# Patient Record
Sex: FEMALE | Race: WHITE | Employment: OTHER | URBAN - METROPOLITAN AREA
[De-identification: names, ages, dates, MRNs, and addresses within clinical notes are randomized per-mention and may not be internally consistent; named-entity substitution may affect disease eponyms.]

---

## 2017-04-10 PROBLEM — G57.93 NEUROPATHY OF BOTH FEET: Status: ACTIVE | Noted: 2017-04-10

## 2017-04-10 PROBLEM — L85.3 XEROSIS OF SKIN: Status: ACTIVE | Noted: 2017-04-10

## 2017-04-17 PROBLEM — R53.83 OTHER FATIGUE: Status: ACTIVE | Noted: 2017-04-17

## 2017-05-11 PROBLEM — E78.5 DYSLIPIDEMIA: Status: ACTIVE | Noted: 2017-05-11

## 2017-07-11 PROCEDURE — 82570 ASSAY OF URINE CREATININE: CPT | Performed by: INTERNAL MEDICINE

## 2017-07-11 PROCEDURE — 82043 UR ALBUMIN QUANTITATIVE: CPT | Performed by: INTERNAL MEDICINE

## 2017-07-17 PROCEDURE — 82607 VITAMIN B-12: CPT | Performed by: INTERNAL MEDICINE

## 2017-07-17 PROCEDURE — 82746 ASSAY OF FOLIC ACID SERUM: CPT | Performed by: INTERNAL MEDICINE

## 2017-07-18 PROBLEM — E53.8 VITAMIN B12 DEFICIENCY: Status: ACTIVE | Noted: 2017-07-18

## 2017-07-18 PROBLEM — D50.9 IRON DEFICIENCY ANEMIA: Status: ACTIVE | Noted: 2017-07-18

## 2017-07-19 PROBLEM — Z85.048 HISTORY OF ANAL CANCER: Status: ACTIVE | Noted: 2017-07-19

## 2017-08-30 PROCEDURE — 82607 VITAMIN B-12: CPT | Performed by: INTERNAL MEDICINE

## 2017-08-30 PROCEDURE — 82746 ASSAY OF FOLIC ACID SERUM: CPT | Performed by: INTERNAL MEDICINE

## 2017-09-01 PROBLEM — E53.8 FOLIC ACID DEFICIENCY: Status: ACTIVE | Noted: 2017-09-01

## 2017-11-27 PROCEDURE — 82607 VITAMIN B-12: CPT | Performed by: NURSE PRACTITIONER

## 2017-11-27 PROCEDURE — 82746 ASSAY OF FOLIC ACID SERUM: CPT | Performed by: NURSE PRACTITIONER

## 2018-02-26 PROBLEM — E78.5 DYSLIPIDEMIA: Status: RESOLVED | Noted: 2017-05-11 | Resolved: 2018-02-26

## 2018-02-26 PROBLEM — L85.3 XEROSIS OF SKIN: Status: RESOLVED | Noted: 2017-04-10 | Resolved: 2018-02-26

## 2018-02-26 PROCEDURE — 82746 ASSAY OF FOLIC ACID SERUM: CPT | Performed by: NURSE PRACTITIONER

## 2018-02-26 PROCEDURE — 82607 VITAMIN B-12: CPT | Performed by: NURSE PRACTITIONER

## 2018-05-11 ENCOUNTER — LAB ENCOUNTER (OUTPATIENT)
Dept: LAB | Age: 68
End: 2018-05-11
Attending: INTERNAL MEDICINE
Payer: MEDICARE

## 2018-05-11 DIAGNOSIS — E66.9 OBESITY, UNSPECIFIED: Primary | ICD-10-CM

## 2018-05-11 DIAGNOSIS — I10 ESSENTIAL HYPERTENSION, MALIGNANT: ICD-10-CM

## 2018-05-11 PROCEDURE — 86140 C-REACTIVE PROTEIN: CPT

## 2018-05-11 PROCEDURE — 80053 COMPREHEN METABOLIC PANEL: CPT

## 2018-05-11 PROCEDURE — 83690 ASSAY OF LIPASE: CPT

## 2018-05-30 PROCEDURE — 82607 VITAMIN B-12: CPT | Performed by: NURSE PRACTITIONER

## 2018-05-30 PROCEDURE — 82746 ASSAY OF FOLIC ACID SERUM: CPT | Performed by: NURSE PRACTITIONER

## 2018-05-31 PROBLEM — E66.01 MORBID OBESITY (HCC): Status: ACTIVE | Noted: 2018-05-31

## 2018-06-01 PROBLEM — K58.0 IRRITABLE BOWEL SYNDROME WITH DIARRHEA: Status: ACTIVE | Noted: 2018-06-01

## 2018-06-26 PROBLEM — E78.00 PURE HYPERCHOLESTEROLEMIA: Status: ACTIVE | Noted: 2018-06-26

## 2019-01-24 PROCEDURE — 82043 UR ALBUMIN QUANTITATIVE: CPT | Performed by: INTERNAL MEDICINE

## 2019-01-24 PROCEDURE — 82570 ASSAY OF URINE CREATININE: CPT | Performed by: INTERNAL MEDICINE

## 2019-02-15 PROBLEM — E78.00 PURE HYPERCHOLESTEROLEMIA: Status: RESOLVED | Noted: 2018-06-26 | Resolved: 2019-02-15

## 2019-05-10 PROBLEM — E11.42 DIABETIC PERIPHERAL NEUROPATHY (HCC): Status: ACTIVE | Noted: 2019-05-10

## 2021-02-11 PROBLEM — R53.83 OTHER FATIGUE: Status: RESOLVED | Noted: 2017-04-17 | Resolved: 2021-02-11

## 2022-02-16 PROBLEM — G57.93 NEUROPATHY OF BOTH FEET: Status: RESOLVED | Noted: 2017-04-10 | Resolved: 2022-02-16

## 2022-02-16 PROBLEM — I12.9 HYPERTENSIVE KIDNEY DISEASE WITH STAGE 3A CHRONIC KIDNEY DISEASE (HCC): Status: ACTIVE | Noted: 2022-02-16

## 2022-02-16 PROBLEM — N18.31 HYPERTENSIVE KIDNEY DISEASE WITH STAGE 3A CHRONIC KIDNEY DISEASE (HCC): Status: ACTIVE | Noted: 2022-02-16

## 2022-02-16 PROBLEM — E66.01 MORBID OBESITY (HCC): Status: RESOLVED | Noted: 2018-05-31 | Resolved: 2022-02-16

## 2022-02-16 PROBLEM — E11.22 TYPE 2 DIABETES MELLITUS WITH STAGE 3A CHRONIC KIDNEY DISEASE, WITHOUT LONG-TERM CURRENT USE OF INSULIN (HCC): Status: ACTIVE | Noted: 2022-02-16

## 2022-02-16 PROBLEM — N18.31 TYPE 2 DIABETES MELLITUS WITH STAGE 3A CHRONIC KIDNEY DISEASE, WITHOUT LONG-TERM CURRENT USE OF INSULIN (HCC): Status: ACTIVE | Noted: 2022-02-16

## 2022-11-20 ENCOUNTER — APPOINTMENT (OUTPATIENT)
Dept: CT IMAGING | Age: 72
End: 2022-11-20
Attending: EMERGENCY MEDICINE
Payer: MEDICARE

## 2022-11-20 ENCOUNTER — HOSPITAL ENCOUNTER (EMERGENCY)
Age: 72
Discharge: HOME | End: 2022-11-20
Attending: EMERGENCY MEDICINE
Payer: MEDICARE

## 2022-11-20 VITALS
SYSTOLIC BLOOD PRESSURE: 143 MMHG | TEMPERATURE: 98 F | BODY MASS INDEX: 35 KG/M2 | RESPIRATION RATE: 18 BRPM | WEIGHT: 230 LBS | DIASTOLIC BLOOD PRESSURE: 64 MMHG | OXYGEN SATURATION: 98 % | HEART RATE: 84 BPM

## 2022-11-20 DIAGNOSIS — M54.40 BACK PAIN OF LUMBAR REGION WITH SCIATICA: Primary | ICD-10-CM

## 2022-11-20 LAB
ALBUMIN SERPL-MCNC: 4.3 G/DL (ref 3.4–5)
ALBUMIN/GLOB SERPL: 1.4 {RATIO} (ref 1–2)
ALP LIVER SERPL-CCNC: 68 U/L
ALT SERPL-CCNC: 25 U/L
ANION GAP SERPL CALC-SCNC: 11 MMOL/L (ref 0–18)
AST SERPL-CCNC: 17 U/L (ref 15–37)
BASOPHILS # BLD AUTO: 0.03 X10(3) UL (ref 0–0.2)
BASOPHILS NFR BLD AUTO: 0.3 %
BILIRUB SERPL-MCNC: 0.6 MG/DL (ref 0.1–2)
BILIRUB UR QL CFM: NEGATIVE
BUN BLD-MCNC: 23 MG/DL (ref 7–18)
CALCIUM BLD-MCNC: 9.4 MG/DL (ref 8.5–10.1)
CHLORIDE SERPL-SCNC: 97 MMOL/L (ref 98–112)
CLARITY UR REFRACT.AUTO: CLEAR
CO2 SERPL-SCNC: 24 MMOL/L (ref 21–32)
COLOR UR AUTO: YELLOW
CREAT BLD-MCNC: 0.92 MG/DL
EOSINOPHIL # BLD AUTO: 0.01 X10(3) UL (ref 0–0.7)
EOSINOPHIL NFR BLD AUTO: 0.1 %
ERYTHROCYTE [DISTWIDTH] IN BLOOD BY AUTOMATED COUNT: 13.1 %
GFR SERPLBLD BASED ON 1.73 SQ M-ARVRAT: 66 ML/MIN/1.73M2 (ref 60–?)
GLOBULIN PLAS-MCNC: 3.1 G/DL (ref 2.8–4.4)
GLUCOSE BLD-MCNC: 148 MG/DL (ref 70–99)
GLUCOSE UR STRIP.AUTO-MCNC: 500 MG/DL
HCT VFR BLD AUTO: 39.9 %
HGB BLD-MCNC: 13.7 G/DL
IMM GRANULOCYTES # BLD AUTO: 0.1 X10(3) UL (ref 0–1)
IMM GRANULOCYTES NFR BLD: 1 %
KETONES UR STRIP.AUTO-MCNC: 40 MG/DL
LEUKOCYTE ESTERASE UR QL STRIP.AUTO: NEGATIVE
LIPASE SERPL-CCNC: 363 U/L (ref 73–393)
LYMPHOCYTES # BLD AUTO: 0.75 X10(3) UL (ref 1–4)
LYMPHOCYTES NFR BLD AUTO: 7.2 %
MCH RBC QN AUTO: 29.5 PG (ref 26–34)
MCHC RBC AUTO-ENTMCNC: 34.3 G/DL (ref 31–37)
MCV RBC AUTO: 85.8 FL
MONOCYTES # BLD AUTO: 0.49 X10(3) UL (ref 0.1–1)
MONOCYTES NFR BLD AUTO: 4.7 %
NEUTROPHILS # BLD AUTO: 8.99 X10 (3) UL (ref 1.5–7.7)
NEUTROPHILS # BLD AUTO: 8.99 X10(3) UL (ref 1.5–7.7)
NEUTROPHILS NFR BLD AUTO: 86.7 %
NITRITE UR QL STRIP.AUTO: NEGATIVE
OSMOLALITY SERPL CALC.SUM OF ELEC: 280 MOSM/KG (ref 275–295)
PH UR STRIP.AUTO: 5 [PH] (ref 5–8)
PLATELET # BLD AUTO: 189 10(3)UL (ref 150–450)
POTASSIUM SERPL-SCNC: 3.8 MMOL/L (ref 3.5–5.1)
PROT SERPL-MCNC: 7.4 G/DL (ref 6.4–8.2)
RBC # BLD AUTO: 4.65 X10(6)UL
SODIUM SERPL-SCNC: 132 MMOL/L (ref 136–145)
SP GR UR STRIP.AUTO: 1.02 (ref 1–1.03)
UROBILINOGEN UR STRIP.AUTO-MCNC: 0.2 MG/DL
WBC # BLD AUTO: 10.4 X10(3) UL (ref 4–11)

## 2022-11-20 PROCEDURE — 96374 THER/PROPH/DIAG INJ IV PUSH: CPT

## 2022-11-20 PROCEDURE — 81001 URINALYSIS AUTO W/SCOPE: CPT | Performed by: EMERGENCY MEDICINE

## 2022-11-20 PROCEDURE — 99284 EMERGENCY DEPT VISIT MOD MDM: CPT

## 2022-11-20 PROCEDURE — 85025 COMPLETE CBC W/AUTO DIFF WBC: CPT | Performed by: EMERGENCY MEDICINE

## 2022-11-20 PROCEDURE — 96376 TX/PRO/DX INJ SAME DRUG ADON: CPT

## 2022-11-20 PROCEDURE — 83690 ASSAY OF LIPASE: CPT | Performed by: EMERGENCY MEDICINE

## 2022-11-20 PROCEDURE — 74176 CT ABD & PELVIS W/O CONTRAST: CPT | Performed by: EMERGENCY MEDICINE

## 2022-11-20 PROCEDURE — 81015 MICROSCOPIC EXAM OF URINE: CPT | Performed by: EMERGENCY MEDICINE

## 2022-11-20 PROCEDURE — 96375 TX/PRO/DX INJ NEW DRUG ADDON: CPT

## 2022-11-20 PROCEDURE — 96361 HYDRATE IV INFUSION ADD-ON: CPT

## 2022-11-20 PROCEDURE — 99285 EMERGENCY DEPT VISIT HI MDM: CPT

## 2022-11-20 PROCEDURE — 80053 COMPREHEN METABOLIC PANEL: CPT | Performed by: EMERGENCY MEDICINE

## 2022-11-20 RX ORDER — HYDROCODONE BITARTRATE AND ACETAMINOPHEN 5; 325 MG/1; MG/1
1-2 TABLET ORAL EVERY 6 HOURS PRN
Qty: 10 TABLET | Refills: 0 | Status: SHIPPED | OUTPATIENT
Start: 2022-11-20 | End: 2022-11-25

## 2022-11-20 RX ORDER — SODIUM CHLORIDE 9 MG/ML
INJECTION, SOLUTION INTRAVENOUS CONTINUOUS
Status: DISCONTINUED | OUTPATIENT
Start: 2022-11-20 | End: 2022-11-20

## 2022-11-20 RX ORDER — FUROSEMIDE 20 MG/1
20 TABLET ORAL DAILY
COMMUNITY

## 2022-11-20 RX ORDER — HYDROMORPHONE HYDROCHLORIDE 1 MG/ML
0.5 INJECTION, SOLUTION INTRAMUSCULAR; INTRAVENOUS; SUBCUTANEOUS ONCE
Status: COMPLETED | OUTPATIENT
Start: 2022-11-20 | End: 2022-11-20

## 2022-11-20 RX ORDER — ONDANSETRON 2 MG/ML
4 INJECTION INTRAMUSCULAR; INTRAVENOUS ONCE
Status: COMPLETED | OUTPATIENT
Start: 2022-11-20 | End: 2022-11-20

## 2022-11-20 RX ORDER — METHYLPREDNISOLONE 4 MG/1
TABLET ORAL
Qty: 1 EACH | Refills: 0 | Status: SHIPPED | OUTPATIENT
Start: 2022-11-20

## 2022-11-20 RX ORDER — KETOROLAC TROMETHAMINE 15 MG/ML
15 INJECTION, SOLUTION INTRAMUSCULAR; INTRAVENOUS ONCE
Status: COMPLETED | OUTPATIENT
Start: 2022-11-20 | End: 2022-11-20

## 2022-11-20 NOTE — DISCHARGE INSTRUCTIONS
Follow-up for further evaluation primary physician. Return if new or worse symptoms. Activity as tolerated. Medrol Dosepak first.  Juan Parry if needed.

## 2022-11-20 NOTE — ED INITIAL ASSESSMENT (HPI)
Lower back pain over last week. Seen chiropractor earlier in week and given \"a couple\" of tx. Vacuuming yesterday and return of pain to lower back.   This morning with painful urination

## 2023-08-18 ENCOUNTER — HOSPITAL ENCOUNTER (INPATIENT)
Facility: HOSPITAL | Age: 73
LOS: 2 days | Discharge: HOME | End: 2023-08-20
Attending: EMERGENCY MEDICINE | Admitting: HOSPITALIST
Payer: MEDICARE

## 2023-08-18 ENCOUNTER — HOSPITAL ENCOUNTER (OUTPATIENT)
Facility: HOSPITAL | Age: 73
Setting detail: OBSERVATION
Discharge: HOME | End: 2023-08-20
Attending: EMERGENCY MEDICINE | Admitting: HOSPITALIST
Payer: MEDICARE

## 2023-08-18 DIAGNOSIS — Z85.048 HISTORY OF ANAL CANCER: ICD-10-CM

## 2023-08-18 DIAGNOSIS — N17.9 ACUTE RENAL FAILURE, UNSPECIFIED ACUTE RENAL FAILURE TYPE (HCC): Primary | ICD-10-CM

## 2023-08-18 PROBLEM — E87.6 HYPOKALEMIA: Status: ACTIVE | Noted: 2023-08-18

## 2023-08-18 PROBLEM — E87.1 HYPONATREMIA: Status: ACTIVE | Noted: 2023-08-18

## 2023-08-18 LAB
ALBUMIN SERPL-MCNC: 4.4 G/DL (ref 3.4–5)
ALBUMIN/GLOB SERPL: 1.3 {RATIO} (ref 1–2)
ALP LIVER SERPL-CCNC: 64 U/L
ALT SERPL-CCNC: 30 U/L
ANION GAP SERPL CALC-SCNC: 10 MMOL/L (ref 0–18)
AST SERPL-CCNC: 22 U/L (ref 15–37)
BASOPHILS # BLD AUTO: 0.05 X10(3) UL (ref 0–0.2)
BASOPHILS NFR BLD AUTO: 0.5 %
BILIRUB SERPL-MCNC: 0.7 MG/DL (ref 0.1–2)
BILIRUB UR QL STRIP.AUTO: NEGATIVE
BUN BLD-MCNC: 34 MG/DL (ref 7–18)
CALCIUM BLD-MCNC: 10 MG/DL (ref 8.5–10.1)
CHLORIDE SERPL-SCNC: 92 MMOL/L (ref 98–112)
CLARITY UR REFRACT.AUTO: CLEAR
CO2 SERPL-SCNC: 27 MMOL/L (ref 21–32)
COLOR UR AUTO: COLORLESS
CREAT BLD-MCNC: 2.15 MG/DL
CREAT UR-SCNC: 27.6 MG/DL
CREAT UR-SCNC: 27.6 MG/DL
EGFRCR SERPLBLD CKD-EPI 2021: 24 ML/MIN/1.73M2 (ref 60–?)
EOSINOPHIL # BLD AUTO: 0.05 X10(3) UL (ref 0–0.7)
EOSINOPHIL NFR BLD AUTO: 0.5 %
ERYTHROCYTE [DISTWIDTH] IN BLOOD BY AUTOMATED COUNT: 12.9 %
GLOBULIN PLAS-MCNC: 3.3 G/DL (ref 2.8–4.4)
GLUCOSE BLD-MCNC: 66 MG/DL (ref 70–99)
GLUCOSE BLD-MCNC: 74 MG/DL (ref 70–99)
GLUCOSE BLD-MCNC: 83 MG/DL (ref 70–99)
GLUCOSE BLD-MCNC: 84 MG/DL (ref 70–99)
GLUCOSE BLD-MCNC: 98 MG/DL (ref 70–99)
GLUCOSE BLD-MCNC: 98 MG/DL (ref 70–99)
GLUCOSE UR STRIP.AUTO-MCNC: 500 MG/DL
HCT VFR BLD AUTO: 39.1 %
HGB BLD-MCNC: 14 G/DL
IMM GRANULOCYTES # BLD AUTO: 0.03 X10(3) UL (ref 0–1)
IMM GRANULOCYTES NFR BLD: 0.3 %
KETONES UR STRIP.AUTO-MCNC: 10 MG/DL
LEUKOCYTE ESTERASE UR QL STRIP.AUTO: 75
LYMPHOCYTES # BLD AUTO: 0.98 X10(3) UL (ref 1–4)
LYMPHOCYTES NFR BLD AUTO: 10.5 %
MCH RBC QN AUTO: 29.2 PG (ref 26–34)
MCHC RBC AUTO-ENTMCNC: 35.8 G/DL (ref 31–37)
MCV RBC AUTO: 81.5 FL
MICROALBUMIN UR-MCNC: 4.92 MG/DL
MICROALBUMIN/CREAT 24H UR-RTO: 178.3 UG/MG (ref ?–30)
MONOCYTES # BLD AUTO: 0.54 X10(3) UL (ref 0.1–1)
MONOCYTES NFR BLD AUTO: 5.8 %
NEUTROPHILS # BLD AUTO: 7.7 X10 (3) UL (ref 1.5–7.7)
NEUTROPHILS # BLD AUTO: 7.7 X10(3) UL (ref 1.5–7.7)
NEUTROPHILS NFR BLD AUTO: 82.4 %
NITRITE UR QL STRIP.AUTO: NEGATIVE
OSMOLALITY SERPL CALC.SUM OF ELEC: 276 MOSM/KG (ref 275–295)
PH UR STRIP.AUTO: 5.5 [PH] (ref 5–8)
PLATELET # BLD AUTO: 182 10(3)UL (ref 150–450)
POTASSIUM SERPL-SCNC: 3.1 MMOL/L (ref 3.5–5.1)
PROT SERPL-MCNC: 7.7 G/DL (ref 6.4–8.2)
PROT UR STRIP.AUTO-MCNC: NEGATIVE MG/DL
PROT UR-MCNC: 24.1 MG/DL
PROT/CREAT UR-RTO: 0.87
RBC # BLD AUTO: 4.8 X10(6)UL
SODIUM SERPL-SCNC: 129 MMOL/L (ref 136–145)
SODIUM SERPL-SCNC: 23 MMOL/L
SP GR UR STRIP.AUTO: <1.005 (ref 1–1.03)
TSI SER-ACNC: 0.53 MIU/ML (ref 0.36–3.74)
UROBILINOGEN UR STRIP.AUTO-MCNC: NORMAL MG/DL
WBC # BLD AUTO: 9.4 X10(3) UL (ref 4–11)

## 2023-08-18 PROCEDURE — 83935 ASSAY OF URINE OSMOLALITY: CPT | Performed by: INTERNAL MEDICINE

## 2023-08-18 PROCEDURE — 81001 URINALYSIS AUTO W/SCOPE: CPT | Performed by: INTERNAL MEDICINE

## 2023-08-18 PROCEDURE — 82962 GLUCOSE BLOOD TEST: CPT

## 2023-08-18 PROCEDURE — 84443 ASSAY THYROID STIM HORMONE: CPT | Performed by: INTERNAL MEDICINE

## 2023-08-18 PROCEDURE — 99285 EMERGENCY DEPT VISIT HI MDM: CPT

## 2023-08-18 PROCEDURE — 82043 UR ALBUMIN QUANTITATIVE: CPT | Performed by: INTERNAL MEDICINE

## 2023-08-18 PROCEDURE — 84300 ASSAY OF URINE SODIUM: CPT | Performed by: INTERNAL MEDICINE

## 2023-08-18 PROCEDURE — 80053 COMPREHEN METABOLIC PANEL: CPT

## 2023-08-18 PROCEDURE — 87086 URINE CULTURE/COLONY COUNT: CPT | Performed by: INTERNAL MEDICINE

## 2023-08-18 PROCEDURE — 82570 ASSAY OF URINE CREATININE: CPT | Performed by: INTERNAL MEDICINE

## 2023-08-18 PROCEDURE — 85025 COMPLETE CBC W/AUTO DIFF WBC: CPT

## 2023-08-18 PROCEDURE — 84156 ASSAY OF PROTEIN URINE: CPT | Performed by: INTERNAL MEDICINE

## 2023-08-18 PROCEDURE — 36415 COLL VENOUS BLD VENIPUNCTURE: CPT

## 2023-08-18 RX ORDER — POTASSIUM CHLORIDE 750 MG/1
10 TABLET, FILM COATED, EXTENDED RELEASE ORAL 2 TIMES DAILY
COMMUNITY

## 2023-08-18 RX ORDER — PANTOPRAZOLE SODIUM 20 MG/1
20 TABLET, DELAYED RELEASE ORAL
Status: DISCONTINUED | OUTPATIENT
Start: 2023-08-19 | End: 2023-08-20

## 2023-08-18 RX ORDER — LEVOTHYROXINE SODIUM 88 UG/1
88 TABLET ORAL DAILY
Status: DISCONTINUED | OUTPATIENT
Start: 2023-08-19 | End: 2023-08-19

## 2023-08-18 RX ORDER — ONDANSETRON 2 MG/ML
4 INJECTION INTRAMUSCULAR; INTRAVENOUS EVERY 6 HOURS PRN
Status: DISCONTINUED | OUTPATIENT
Start: 2023-08-18 | End: 2023-08-20

## 2023-08-18 RX ORDER — ASPIRIN 81 MG/1
81 TABLET ORAL DAILY
Status: DISCONTINUED | OUTPATIENT
Start: 2023-08-19 | End: 2023-08-20

## 2023-08-18 RX ORDER — ACETAMINOPHEN 325 MG/1
650 TABLET ORAL EVERY 6 HOURS PRN
Status: DISCONTINUED | OUTPATIENT
Start: 2023-08-18 | End: 2023-08-20

## 2023-08-18 RX ORDER — METOPROLOL TARTRATE 50 MG/1
50 TABLET, FILM COATED ORAL 2 TIMES DAILY
Status: DISCONTINUED | OUTPATIENT
Start: 2023-08-19 | End: 2023-08-20

## 2023-08-18 RX ORDER — ROSUVASTATIN CALCIUM 20 MG/1
20 TABLET, COATED ORAL DAILY
Status: DISCONTINUED | OUTPATIENT
Start: 2023-08-19 | End: 2023-08-20

## 2023-08-18 RX ORDER — POTASSIUM CHLORIDE 20 MEQ/1
40 TABLET, EXTENDED RELEASE ORAL ONCE
Status: COMPLETED | OUTPATIENT
Start: 2023-08-18 | End: 2023-08-18

## 2023-08-18 RX ORDER — LEVOTHYROXINE SODIUM 88 UG/1
88 TABLET ORAL
COMMUNITY

## 2023-08-18 RX ORDER — TIRZEPATIDE 7.5 MG/.5ML
7.5 INJECTION, SOLUTION SUBCUTANEOUS WEEKLY
COMMUNITY

## 2023-08-18 RX ORDER — ASCORBIC ACID 500 MG
1000 TABLET ORAL DAILY
Status: DISCONTINUED | OUTPATIENT
Start: 2023-08-19 | End: 2023-08-20

## 2023-08-18 RX ORDER — LOPERAMIDE HCL 1 MG/7.5ML
2 SOLUTION ORAL 4 TIMES DAILY PRN
Status: DISCONTINUED | OUTPATIENT
Start: 2023-08-18 | End: 2023-08-20

## 2023-08-18 RX ORDER — HEPARIN SODIUM 5000 [USP'U]/ML
5000 INJECTION, SOLUTION INTRAVENOUS; SUBCUTANEOUS EVERY 8 HOURS SCHEDULED
Status: DISCONTINUED | OUTPATIENT
Start: 2023-08-18 | End: 2023-08-20

## 2023-08-18 RX ORDER — SODIUM CHLORIDE 9 MG/ML
INJECTION, SOLUTION INTRAVENOUS CONTINUOUS
Status: DISCONTINUED | OUTPATIENT
Start: 2023-08-18 | End: 2023-08-20

## 2023-08-18 NOTE — ED QUICK NOTES
Rounding Completed. Received report from [place]. Patient is alert and oriented, resting in position of comfort, vitals stable, no distress noted. Plan of Care reviewed. Waiting for room assignment. Elimination needs assessed. Provided warm blanket and family presence. Bed is locked and in lowest position. Call light within reach.

## 2023-08-18 NOTE — ED QUICK NOTES
Orders for admission, patient is aware of plan and ready to go upstairs. Any questions, please call ED RN Elen Rivera at extension [phone].      Patient Covid vaccination status: Fully vaccinated     COVID Test Ordered in ED: None    COVID Suspicion at Admission: N/A    Running Infusions:  None    Mental Status/LOC at time of transport: AAOx4    Other pertinent information:   CIWA score: N/A   NIH score:  N/A

## 2023-08-18 NOTE — ED INITIAL ASSESSMENT (HPI)
Patient arrives to the ED via walk-in A&Ox4/4 and ambulatory with c/o BALJEET from [address] [place] [address] Nephrology. Per call-in note, patient has a creatine of 2.4 and was told to go to the ED for further eval and workup.

## 2023-08-18 NOTE — PLAN OF CARE
Patient admitted to room 384 for BALJEET from nephrologist office. Vitals stable, pain controlled. Wound care and renal consulted. No

## 2023-08-19 ENCOUNTER — APPOINTMENT (OUTPATIENT)
Dept: ULTRASOUND IMAGING | Facility: HOSPITAL | Age: 73
End: 2023-08-19
Attending: INTERNAL MEDICINE
Payer: MEDICARE

## 2023-08-19 LAB
ALBUMIN SERPL-MCNC: 3.6 G/DL (ref 3.4–5)
ANION GAP SERPL CALC-SCNC: 9 MMOL/L (ref 0–18)
BASOPHILS # BLD AUTO: 0.04 X10(3) UL (ref 0–0.2)
BASOPHILS NFR BLD AUTO: 0.8 %
BUN BLD-MCNC: 26 MG/DL (ref 7–18)
C3 SERPL-MCNC: 105 MG/DL (ref 90–180)
C4 SERPL-MCNC: 28.1 MG/DL (ref 10–40)
CALCIUM BLD-MCNC: 9.1 MG/DL (ref 8.5–10.1)
CHLORIDE SERPL-SCNC: 102 MMOL/L (ref 98–112)
CK SERPL-CCNC: 60 U/L
CO2 SERPL-SCNC: 25 MMOL/L (ref 21–32)
CREAT BLD-MCNC: 1.49 MG/DL
EGFRCR SERPLBLD CKD-EPI 2021: 37 ML/MIN/1.73M2 (ref 60–?)
EOSINOPHIL # BLD AUTO: 0.11 X10(3) UL (ref 0–0.7)
EOSINOPHIL NFR BLD AUTO: 2.2 %
EOSINOPHIL URNS QL WRIGHT STN: NEGATIVE
ERYTHROCYTE [DISTWIDTH] IN BLOOD BY AUTOMATED COUNT: 12.9 %
GLUCOSE BLD-MCNC: 81 MG/DL (ref 70–99)
GLUCOSE BLD-MCNC: 82 MG/DL (ref 70–99)
GLUCOSE BLD-MCNC: 84 MG/DL (ref 70–99)
GLUCOSE BLD-MCNC: 85 MG/DL (ref 70–99)
GLUCOSE BLD-MCNC: 91 MG/DL (ref 70–99)
GLUCOSE BLD-MCNC: 94 MG/DL (ref 70–99)
HBV CORE AB SERPL QL IA: NONREACTIVE
HBV SURFACE AB SER QL: NONREACTIVE
HBV SURFACE AB SERPL IA-ACNC: <3.1 MIU/ML
HBV SURFACE AG SER-ACNC: <0.1 [IU]/L
HBV SURFACE AG SERPL QL IA: NONREACTIVE
HCT VFR BLD AUTO: 34.7 %
HCV AB SERPL QL IA: NONREACTIVE
HGB BLD-MCNC: 12.1 G/DL
IMM GRANULOCYTES # BLD AUTO: 0.02 X10(3) UL (ref 0–1)
IMM GRANULOCYTES NFR BLD: 0.4 %
LYMPHOCYTES # BLD AUTO: 1.54 X10(3) UL (ref 1–4)
LYMPHOCYTES NFR BLD AUTO: 30.2 %
MAGNESIUM SERPL-MCNC: 1.8 MG/DL (ref 1.6–2.6)
MCH RBC QN AUTO: 28.8 PG (ref 26–34)
MCHC RBC AUTO-ENTMCNC: 34.9 G/DL (ref 31–37)
MCV RBC AUTO: 82.6 FL
MONOCYTES # BLD AUTO: 0.68 X10(3) UL (ref 0.1–1)
MONOCYTES NFR BLD AUTO: 13.3 %
NEUTROPHILS # BLD AUTO: 2.71 X10 (3) UL (ref 1.5–7.7)
NEUTROPHILS # BLD AUTO: 2.71 X10(3) UL (ref 1.5–7.7)
NEUTROPHILS NFR BLD AUTO: 53.1 %
OSMOLALITY SERPL CALC.SUM OF ELEC: 286 MOSM/KG (ref 275–295)
OSMOLALITY UR: 196 MOSM/KG (ref 300–1300)
PHOSPHATE SERPL-MCNC: 2.8 MG/DL (ref 2.5–4.9)
PLATELET # BLD AUTO: 144 10(3)UL (ref 150–450)
POTASSIUM SERPL-SCNC: 3 MMOL/L (ref 3.5–5.1)
POTASSIUM SERPL-SCNC: 3.3 MMOL/L (ref 3.5–5.1)
PROT UR-MCNC: 41 MG/DL
RBC # BLD AUTO: 4.2 X10(6)UL
SODIUM SERPL-SCNC: 136 MMOL/L (ref 136–145)
WBC # BLD AUTO: 5.1 X10(3) UL (ref 4–11)

## 2023-08-19 PROCEDURE — 87340 HEPATITIS B SURFACE AG IA: CPT | Performed by: INTERNAL MEDICINE

## 2023-08-19 PROCEDURE — 84166 PROTEIN E-PHORESIS/URINE/CSF: CPT | Performed by: INTERNAL MEDICINE

## 2023-08-19 PROCEDURE — 85025 COMPLETE CBC W/AUTO DIFF WBC: CPT | Performed by: INTERNAL MEDICINE

## 2023-08-19 PROCEDURE — 82550 ASSAY OF CK (CPK): CPT | Performed by: INTERNAL MEDICINE

## 2023-08-19 PROCEDURE — 86706 HEP B SURFACE ANTIBODY: CPT | Performed by: INTERNAL MEDICINE

## 2023-08-19 PROCEDURE — 86334 IMMUNOFIX E-PHORESIS SERUM: CPT | Performed by: INTERNAL MEDICINE

## 2023-08-19 PROCEDURE — 84132 ASSAY OF SERUM POTASSIUM: CPT | Performed by: HOSPITALIST

## 2023-08-19 PROCEDURE — 83735 ASSAY OF MAGNESIUM: CPT | Performed by: INTERNAL MEDICINE

## 2023-08-19 PROCEDURE — 84165 PROTEIN E-PHORESIS SERUM: CPT | Performed by: INTERNAL MEDICINE

## 2023-08-19 PROCEDURE — 87205 SMEAR GRAM STAIN: CPT | Performed by: INTERNAL MEDICINE

## 2023-08-19 PROCEDURE — 86038 ANTINUCLEAR ANTIBODIES: CPT | Performed by: INTERNAL MEDICINE

## 2023-08-19 PROCEDURE — 86225 DNA ANTIBODY NATIVE: CPT | Performed by: INTERNAL MEDICINE

## 2023-08-19 PROCEDURE — 86335 IMMUNFIX E-PHORSIS/URINE/CSF: CPT | Performed by: INTERNAL MEDICINE

## 2023-08-19 PROCEDURE — 86160 COMPLEMENT ANTIGEN: CPT | Performed by: INTERNAL MEDICINE

## 2023-08-19 PROCEDURE — 83521 IG LIGHT CHAINS FREE EACH: CPT | Performed by: INTERNAL MEDICINE

## 2023-08-19 PROCEDURE — 80069 RENAL FUNCTION PANEL: CPT | Performed by: INTERNAL MEDICINE

## 2023-08-19 PROCEDURE — 86803 HEPATITIS C AB TEST: CPT | Performed by: INTERNAL MEDICINE

## 2023-08-19 PROCEDURE — 86704 HEP B CORE ANTIBODY TOTAL: CPT | Performed by: INTERNAL MEDICINE

## 2023-08-19 PROCEDURE — 84156 ASSAY OF PROTEIN URINE: CPT | Performed by: INTERNAL MEDICINE

## 2023-08-19 PROCEDURE — 82962 GLUCOSE BLOOD TEST: CPT

## 2023-08-19 PROCEDURE — 76775 US EXAM ABDO BACK WALL LIM: CPT | Performed by: INTERNAL MEDICINE

## 2023-08-19 RX ORDER — POTASSIUM CHLORIDE 20 MEQ/1
40 TABLET, EXTENDED RELEASE ORAL ONCE
Status: COMPLETED | OUTPATIENT
Start: 2023-08-19 | End: 2023-08-19

## 2023-08-19 RX ORDER — LEVOTHYROXINE SODIUM 88 UG/1
88 TABLET ORAL
Status: DISCONTINUED | OUTPATIENT
Start: 2023-08-19 | End: 2023-08-20

## 2023-08-19 RX ORDER — MAGNESIUM OXIDE 400 MG/1
400 TABLET ORAL ONCE
Status: COMPLETED | OUTPATIENT
Start: 2023-08-19 | End: 2023-08-19

## 2023-08-19 RX ORDER — POTASSIUM CHLORIDE 20 MEQ/1
40 TABLET, EXTENDED RELEASE ORAL EVERY 4 HOURS
Status: COMPLETED | OUTPATIENT
Start: 2023-08-19 | End: 2023-08-19

## 2023-08-19 NOTE — PLAN OF CARE
Patient A&O X4 on RA- hx VIRAJ no CPAP. VSS, /IS/telemetry- NSR. SCDs, subQ Heparin. Voiding freely via bathroom, LBM 8/18. IVF infusing per orders. Wound to perineum- WOCN on consult. Up ad cait. Reminded to use call light. Plan to dc home when cleared.

## 2023-08-19 NOTE — PLAN OF CARE
Alert and oriented x 4. Vitals stable on room air. Denies pain. Voiding clear yellow urine, denies urinary symptoms. Last BM 8/18/23. Tolerating regular diet. IVF infusing and electrolytes replaced. See flowsheets for open wound to rectal area. Safety precautions in place. Plan for discharge home pending nephrology clearance. Plan of care discussed with patient.

## 2023-08-20 VITALS
HEIGHT: 68 IN | DIASTOLIC BLOOD PRESSURE: 77 MMHG | TEMPERATURE: 98 F | HEART RATE: 77 BPM | SYSTOLIC BLOOD PRESSURE: 140 MMHG | OXYGEN SATURATION: 100 % | BODY MASS INDEX: 32.28 KG/M2 | WEIGHT: 213 LBS | RESPIRATION RATE: 24 BRPM

## 2023-08-20 LAB
ALBUMIN SERPL-MCNC: 3.4 G/DL (ref 3.4–5)
ANION GAP SERPL CALC-SCNC: 4 MMOL/L (ref 0–18)
BUN BLD-MCNC: 18 MG/DL (ref 7–18)
CALCIUM BLD-MCNC: 8.8 MG/DL (ref 8.5–10.1)
CHLORIDE SERPL-SCNC: 109 MMOL/L (ref 98–112)
CO2 SERPL-SCNC: 24 MMOL/L (ref 21–32)
CREAT BLD-MCNC: 1.37 MG/DL
EGFRCR SERPLBLD CKD-EPI 2021: 41 ML/MIN/1.73M2 (ref 60–?)
GLUCOSE BLD-MCNC: 100 MG/DL (ref 70–99)
GLUCOSE BLD-MCNC: 114 MG/DL (ref 70–99)
GLUCOSE BLD-MCNC: 83 MG/DL (ref 70–99)
GLUCOSE BLD-MCNC: 85 MG/DL (ref 70–99)
MAGNESIUM SERPL-MCNC: 1.7 MG/DL (ref 1.6–2.6)
OSMOLALITY SERPL CALC.SUM OF ELEC: 285 MOSM/KG (ref 275–295)
PHOSPHATE SERPL-MCNC: 1.9 MG/DL (ref 2.5–4.9)
PHOSPHATE SERPL-MCNC: 2.4 MG/DL (ref 2.5–4.9)
POTASSIUM SERPL-SCNC: 4.2 MMOL/L (ref 3.5–5.1)
POTASSIUM SERPL-SCNC: 4.2 MMOL/L (ref 3.5–5.1)
SODIUM SERPL-SCNC: 137 MMOL/L (ref 136–145)

## 2023-08-20 PROCEDURE — 82962 GLUCOSE BLOOD TEST: CPT

## 2023-08-20 PROCEDURE — 84100 ASSAY OF PHOSPHORUS: CPT | Performed by: INTERNAL MEDICINE

## 2023-08-20 PROCEDURE — 83735 ASSAY OF MAGNESIUM: CPT | Performed by: INTERNAL MEDICINE

## 2023-08-20 PROCEDURE — 84132 ASSAY OF SERUM POTASSIUM: CPT | Performed by: HOSPITALIST

## 2023-08-20 PROCEDURE — 80069 RENAL FUNCTION PANEL: CPT | Performed by: INTERNAL MEDICINE

## 2023-08-20 RX ORDER — MAGNESIUM OXIDE 400 MG/1
400 TABLET ORAL ONCE
Status: COMPLETED | OUTPATIENT
Start: 2023-08-20 | End: 2023-08-20

## 2023-08-20 RX ORDER — AMLODIPINE BESYLATE 5 MG/1
5 TABLET ORAL 2 TIMES DAILY
Qty: 60 TABLET | Refills: 0 | Status: SHIPPED | OUTPATIENT
Start: 2023-08-20 | End: 2023-09-19

## 2023-08-20 RX ORDER — AMLODIPINE BESYLATE 5 MG/1
5 TABLET ORAL 2 TIMES DAILY
Status: DISCONTINUED | OUTPATIENT
Start: 2023-08-20 | End: 2023-08-20

## 2023-08-20 NOTE — PROGRESS NOTES
Discharge AVS reviewed with patient. All questions answered. IV removed. Bedside belongings packed up and returned to patient.

## 2023-08-20 NOTE — PLAN OF CARE
Alert and oriented x 4. Vitals stable on room air. Denies pain. Voiding clear yellow urine, denies urinary symptoms. Last BM 8/19/23. Tolerating regular diet. IVF infusing and electrolytes replaced. See flowsheets for open wound to rectal area. Safety precautions in place. Plan for discharge home pending nephrology clearance. Plan of care discussed with patient.

## 2023-08-20 NOTE — DISCHARGE INSTRUCTIONS
Keep hydrated and drink plenty of fluids. Maintain a well-balanced diet. Follow up with your nephrologist this week to repeat labs. Notify your nephrologist of any difficulty urinating, pain with urination, dark colored urine, or foul-smelling urine.

## 2023-08-20 NOTE — PLAN OF CARE
Alert and oriented x 4. Vitals stable on room air. Patient denies any pain. Voiding without difficulty. Last BM 8/19/2023. Tolerating regular diet. Independent in room. Safety precautions maintained. Call light within reach.

## 2023-08-20 NOTE — DISCHARGE SUMMARY
General Medicine Discharge Summary     Patient ID:  Bhupinder Smith  68year old  3/22/1950    Admit date: 8/18/2023    Discharge date and time:8/20/2023    Attending Physician: Risa Metcalf MD     Primary Care Physician: Slime Perez) Melanie Butler MD     Reason for admission: see HPI    Discharge Diagnoses: Acute renal failure, unspecified acute renal failure type [facility]) [N17.9]    Discharged Condition: good    Exam: (see progress notes for full details)  No acute distress, alert and oriented    Hospital Course:   Patient is a 68year old female with PMH sig for CAD, HTN, HLD, DM II, VIRAJ, and hypothyroidism who presented to the ED for evaluation of abnormal labs. Her PCP checked a CMP yesterday that showed BUN/Cr 34/2.49 (was 26/0.86 on 4/20/23). Pt has been c/o nausea but denies dizziness, abd pain, vomiting, or diarrhea. Admits that she is not drinking as much as she should. She takes furosemide and hydrochlorothiazide. No F/C or dysuria. In the ED, BUNCr. 34/2.15, Na 129. 40 mEq Kcl given. On my evaluation, pt denies any symptoms. # Acute kidney injury  -Significant improvement with creatinine of 1.37 today from 2.15  -Continue IV fluids,  -Discussed with nephrology  - suspect prerenal due diuretics and poor oral intake  - hold lasix, hydrochlorothiazide, and lisinopril -defer these medications to nephrology on discharge  - renal c/s appreciated IU.   - I renal ultrasound negative for any obstruction  Patient doing well otherwise-  -discussed with nephrology once cleared by nephrology patient likely discharge with repeat BMP in about a week  # Hyponatremia   #Hypokalemia-replete per protocol  -Resolved  - suspect due to diuretics in the setting of poor oral intake   - hold diuretics as above  - IVFs per renal recs  -Repeat BMP as an outpatient     # Essential HTN  - controlled  - hold ACE-I, hydrochlorothiazide -hold HCTZ indefinitely per renal  - resume metoprolol      # HLD  - cont statin     # DM II with hyperglycemia   - hold po MD meds  - ISS with accuchecks      # Hypothyroidism   - cont levothyroxine      # GERD  - cont PPI    Consults: IP CONSULT TO NEPHROLOGY  IP CONSULT TO HOSPITALIST  CONSULT TO WOUND OSTOMY    Operative Procedures:      Disposition: home    Patient Instructions:   Current Discharge Medication List    CONTINUE these medications which have NOT CHANGED    levothyroxine 88 MCG Oral Tab  Take 1 tablet (88 mcg total) by mouth before breakfast.    Tirzepatide (MOUNJARO) 7.5 MG/0.5ML Subcutaneous Solution Pen-injector  Inject 7.5 mg into the skin once a week. Potassium Chloride ER 10 MEQ Oral Tab CR  Take 1 tablet (10 mEq total) by mouth 2 (two) times daily. furosemide 20 MG Oral Tab  Take 1 tablet (20 mg total) by mouth daily. Fexofenadine HCl (ALLEGRA OR)      METOPROLOL TARTRATE 50 MG Oral Tab  TAKE 1 TABLET(50 MG) BY MOUTH TWICE DAILY    Dapagliflozin-metFORMIN HCl ER (XIGDUO XR) 5-1000 MG Oral Tablet 24 Hr  Take 1 tablet by mouth daily. hydroCHLOROthiazide 25 MG Oral Tab  Take 1 tablet (25 mg total) by mouth daily. lisinopril 40 MG Oral Tab  TAKE 1 TABLET BY MOUTH DAILY AS DIRECTED    rosuvastatin 20 MG Oral Tab  Take 1 tablet (20 mg total) by mouth daily. Ascorbic Acid (VITAMIN C) 1000 MG Oral Tab  Take 1 tablet (1,000 mg total) by mouth daily. Glucose Blood In Vitro Strip  Twice a day    OMEPRAZOLE 20 MG Oral Capsule Delayed Release  TAKE 1 CAPSULE BY MOUTH DAILY    aspirin 81 MG Oral Tab  Take 1 tablet (81 mg total) by mouth daily. Omega-3 Fatty Acids (FISH OIL OR)  Take by mouth daily. Loperamide HCl 1 MG/5ML Oral Liquid  Take by mouth 4 (four) times daily as needed for Diarrhea. I reconciled current and discharge medications on day of discharge    Follow-up with PCP, nephrology    No orders of the defined types were placed in this encounter.       Follow-up with labs: BMP    Total Time Coordinating Care: Greater than 30 minutes    Patient had opportunity to ask questions and state understand and agree with therapeutic plan as outlined above.      Thank Vaishnavi Dumont MD

## 2023-08-21 LAB
DSDNA IGG SERPL IA-ACNC: 1.8 IU/ML
ENA AB SER QL IA: 0.2 UG/L
ENA AB SER QL IA: NEGATIVE

## 2023-08-22 LAB
ALBUMIN SERPL ELPH-MCNC: 4.24 G/DL (ref 3.75–5.21)
ALBUMIN/GLOB SERPL: 1.79 {RATIO} (ref 1–2)
ALPHA1 GLOB SERPL ELPH-MCNC: 0.26 G/DL (ref 0.19–0.46)
ALPHA2 GLOB SERPL ELPH-MCNC: 0.81 G/DL (ref 0.48–1.05)
B-GLOBULIN SERPL ELPH-MCNC: 0.68 G/DL (ref 0.68–1.23)
GAMMA GLOB SERPL ELPH-MCNC: 0.62 G/DL (ref 0.62–1.7)
KAPPA LC FREE SER-MCNC: 2.08 MG/DL (ref 0.33–1.94)
KAPPA LC FREE/LAMBDA FREE SER NEPH: 1.37 {RATIO} (ref 0.26–1.65)
LAMBDA LC FREE SERPL-MCNC: 1.52 MG/DL (ref 0.57–2.63)
PROT SERPL-MCNC: 6.6 G/DL (ref 6.4–8.2)

## 2024-03-15 ENCOUNTER — HOSPITAL ENCOUNTER (INPATIENT)
Facility: HOSPITAL | Age: 74
LOS: 3 days | Discharge: HOME | End: 2024-03-18
Attending: EMERGENCY MEDICINE | Admitting: HOSPITALIST
Payer: MEDICARE

## 2024-03-15 DIAGNOSIS — E87.1 HYPONATREMIA: Primary | ICD-10-CM

## 2024-03-15 DIAGNOSIS — R53.1 WEAKNESS GENERALIZED: ICD-10-CM

## 2024-03-15 LAB
ALBUMIN SERPL-MCNC: 4.2 G/DL (ref 3.4–5)
ALBUMIN/GLOB SERPL: 1.4 {RATIO} (ref 1–2)
ALP LIVER SERPL-CCNC: 53 U/L
ALT SERPL-CCNC: 30 U/L
ANION GAP SERPL CALC-SCNC: 11 MMOL/L (ref 0–18)
ANION GAP SERPL CALC-SCNC: 9 MMOL/L (ref 0–18)
AST SERPL-CCNC: 22 U/L (ref 15–37)
BASOPHILS # BLD AUTO: 0.04 X10(3) UL (ref 0–0.2)
BASOPHILS NFR BLD AUTO: 0.5 %
BILIRUB SERPL-MCNC: 0.9 MG/DL (ref 0.1–2)
BILIRUB UR QL STRIP.AUTO: NEGATIVE
BUN BLD-MCNC: 13 MG/DL (ref 9–23)
BUN BLD-MCNC: 14 MG/DL (ref 9–23)
CALCIUM BLD-MCNC: 10.1 MG/DL (ref 8.5–10.1)
CALCIUM BLD-MCNC: 9.6 MG/DL (ref 8.5–10.1)
CHLORIDE SERPL-SCNC: 82 MMOL/L (ref 98–112)
CHLORIDE SERPL-SCNC: 87 MMOL/L (ref 98–112)
CLARITY UR REFRACT.AUTO: CLEAR
CO2 SERPL-SCNC: 28 MMOL/L (ref 21–32)
CO2 SERPL-SCNC: 29 MMOL/L (ref 21–32)
COLOR UR AUTO: COLORLESS
CREAT BLD-MCNC: 0.75 MG/DL
CREAT BLD-MCNC: 0.87 MG/DL
EGFRCR SERPLBLD CKD-EPI 2021: 70 ML/MIN/1.73M2 (ref 60–?)
EGFRCR SERPLBLD CKD-EPI 2021: 84 ML/MIN/1.73M2 (ref 60–?)
EOSINOPHIL # BLD AUTO: 0.02 X10(3) UL (ref 0–0.7)
EOSINOPHIL NFR BLD AUTO: 0.3 %
ERYTHROCYTE [DISTWIDTH] IN BLOOD BY AUTOMATED COUNT: 13.2 %
FLUAV + FLUBV RNA SPEC NAA+PROBE: NEGATIVE
FLUAV + FLUBV RNA SPEC NAA+PROBE: NEGATIVE
GLOBULIN PLAS-MCNC: 2.9 G/DL (ref 2.8–4.4)
GLUCOSE BLD-MCNC: 109 MG/DL (ref 70–99)
GLUCOSE BLD-MCNC: 77 MG/DL (ref 70–99)
GLUCOSE BLD-MCNC: 87 MG/DL (ref 70–99)
GLUCOSE UR STRIP.AUTO-MCNC: NORMAL MG/DL
HCT VFR BLD AUTO: 43.5 %
HGB BLD-MCNC: 15.9 G/DL
HYALINE CASTS #/AREA URNS AUTO: PRESENT /LPF
IMM GRANULOCYTES # BLD AUTO: 0.02 X10(3) UL (ref 0–1)
IMM GRANULOCYTES NFR BLD: 0.3 %
KETONES UR STRIP.AUTO-MCNC: NEGATIVE MG/DL
LEUKOCYTE ESTERASE UR QL STRIP.AUTO: 250
LYMPHOCYTES # BLD AUTO: 1.13 X10(3) UL (ref 1–4)
LYMPHOCYTES NFR BLD AUTO: 15 %
MCH RBC QN AUTO: 30.1 PG (ref 26–34)
MCHC RBC AUTO-ENTMCNC: 36.6 G/DL (ref 31–37)
MCV RBC AUTO: 82.4 FL
MONOCYTES # BLD AUTO: 0.56 X10(3) UL (ref 0.1–1)
MONOCYTES NFR BLD AUTO: 7.4 %
NEUTROPHILS # BLD AUTO: 5.76 X10 (3) UL (ref 1.5–7.7)
NEUTROPHILS # BLD AUTO: 5.76 X10(3) UL (ref 1.5–7.7)
NEUTROPHILS NFR BLD AUTO: 76.5 %
NITRITE UR QL STRIP.AUTO: NEGATIVE
OSMOLALITY SERPL CALC.SUM OF ELEC: 255 MOSM/KG (ref 275–295)
OSMOLALITY SERPL CALC.SUM OF ELEC: 257 MOSM/KG (ref 275–295)
OSMOLALITY UR: 155 MOSM/KG (ref 300–1300)
PH UR STRIP.AUTO: 5 [PH] (ref 5–8)
PLATELET # BLD AUTO: 175 10(3)UL (ref 150–450)
POTASSIUM SERPL-SCNC: 3.2 MMOL/L (ref 3.5–5.1)
POTASSIUM SERPL-SCNC: 3.6 MMOL/L (ref 3.5–5.1)
POTASSIUM UR-SCNC: 11.8 MMOL/L
PROT SERPL-MCNC: 7.1 G/DL (ref 6.4–8.2)
PROT UR STRIP.AUTO-MCNC: NEGATIVE MG/DL
RBC # BLD AUTO: 5.28 X10(6)UL
RBC UR QL AUTO: NEGATIVE
RSV RNA SPEC NAA+PROBE: NEGATIVE
SARS-COV-2 RNA RESP QL NAA+PROBE: NOT DETECTED
SODIUM SERPL-SCNC: 122 MMOL/L (ref 136–145)
SODIUM SERPL-SCNC: 124 MMOL/L (ref 136–145)
SODIUM SERPL-SCNC: 25 MMOL/L
SP GR UR STRIP.AUTO: <1.005 (ref 1–1.03)
UROBILINOGEN UR STRIP.AUTO-MCNC: NORMAL MG/DL
WBC # BLD AUTO: 7.5 X10(3) UL (ref 4–11)

## 2024-03-15 PROCEDURE — 80053 COMPREHEN METABOLIC PANEL: CPT

## 2024-03-15 PROCEDURE — 84133 ASSAY OF URINE POTASSIUM: CPT | Performed by: STUDENT IN AN ORGANIZED HEALTH CARE EDUCATION/TRAINING PROGRAM

## 2024-03-15 PROCEDURE — 96360 HYDRATION IV INFUSION INIT: CPT

## 2024-03-15 PROCEDURE — 83935 ASSAY OF URINE OSMOLALITY: CPT | Performed by: STUDENT IN AN ORGANIZED HEALTH CARE EDUCATION/TRAINING PROGRAM

## 2024-03-15 PROCEDURE — 85025 COMPLETE CBC W/AUTO DIFF WBC: CPT

## 2024-03-15 PROCEDURE — 81001 URINALYSIS AUTO W/SCOPE: CPT | Performed by: EMERGENCY MEDICINE

## 2024-03-15 PROCEDURE — 85025 COMPLETE CBC W/AUTO DIFF WBC: CPT | Performed by: EMERGENCY MEDICINE

## 2024-03-15 PROCEDURE — 96361 HYDRATE IV INFUSION ADD-ON: CPT

## 2024-03-15 PROCEDURE — 99285 EMERGENCY DEPT VISIT HI MDM: CPT

## 2024-03-15 PROCEDURE — 84300 ASSAY OF URINE SODIUM: CPT | Performed by: STUDENT IN AN ORGANIZED HEALTH CARE EDUCATION/TRAINING PROGRAM

## 2024-03-15 PROCEDURE — 82962 GLUCOSE BLOOD TEST: CPT

## 2024-03-15 PROCEDURE — 80053 COMPREHEN METABOLIC PANEL: CPT | Performed by: EMERGENCY MEDICINE

## 2024-03-15 PROCEDURE — 0241U SARS-COV-2/FLU A AND B/RSV BY PCR (GENEXPERT): CPT | Performed by: EMERGENCY MEDICINE

## 2024-03-15 RX ORDER — PANTOPRAZOLE SODIUM 40 MG/1
40 TABLET, DELAYED RELEASE ORAL
Status: DISCONTINUED | OUTPATIENT
Start: 2024-03-16 | End: 2024-03-18

## 2024-03-15 RX ORDER — ASPIRIN 81 MG/1
81 TABLET ORAL DAILY
Status: DISCONTINUED | OUTPATIENT
Start: 2024-03-16 | End: 2024-03-18

## 2024-03-15 RX ORDER — ACETAMINOPHEN 500 MG
500 TABLET ORAL EVERY 4 HOURS PRN
Status: DISCONTINUED | OUTPATIENT
Start: 2024-03-15 | End: 2024-03-16

## 2024-03-15 RX ORDER — ONDANSETRON 2 MG/ML
4 INJECTION INTRAMUSCULAR; INTRAVENOUS EVERY 4 HOURS PRN
Status: ACTIVE | OUTPATIENT
Start: 2024-03-15 | End: 2024-03-15

## 2024-03-15 RX ORDER — FUROSEMIDE 20 MG/1
20 TABLET ORAL DAILY
COMMUNITY
End: 2024-03-18

## 2024-03-15 RX ORDER — ROSUVASTATIN CALCIUM 20 MG/1
20 TABLET, COATED ORAL NIGHTLY
Status: DISCONTINUED | OUTPATIENT
Start: 2024-03-15 | End: 2024-03-18

## 2024-03-15 RX ORDER — POTASSIUM CHLORIDE 20 MEQ/1
40 TABLET, EXTENDED RELEASE ORAL ONCE
Status: COMPLETED | OUTPATIENT
Start: 2024-03-15 | End: 2024-03-15

## 2024-03-15 RX ORDER — LISINOPRIL 10 MG/1
10 TABLET ORAL DAILY
COMMUNITY

## 2024-03-15 RX ORDER — METOPROLOL TARTRATE 50 MG/1
50 TABLET, FILM COATED ORAL 2 TIMES DAILY
Status: DISCONTINUED | OUTPATIENT
Start: 2024-03-16 | End: 2024-03-18

## 2024-03-15 RX ORDER — LEVOTHYROXINE SODIUM 88 UG/1
88 TABLET ORAL
Status: DISCONTINUED | OUTPATIENT
Start: 2024-03-16 | End: 2024-03-18

## 2024-03-15 RX ORDER — AMLODIPINE BESYLATE 5 MG/1
5 TABLET ORAL DAILY
COMMUNITY

## 2024-03-15 RX ORDER — SODIUM CHLORIDE 9 MG/ML
INJECTION, SOLUTION INTRAVENOUS CONTINUOUS
Status: ACTIVE | OUTPATIENT
Start: 2024-03-15 | End: 2024-03-15

## 2024-03-15 RX ORDER — HEPARIN SODIUM 5000 [USP'U]/ML
5000 INJECTION, SOLUTION INTRAVENOUS; SUBCUTANEOUS EVERY 12 HOURS SCHEDULED
Status: DISCONTINUED | OUTPATIENT
Start: 2024-03-16 | End: 2024-03-18

## 2024-03-15 NOTE — ED QUICK NOTES
Orders for admission, patient is aware of plan and ready to go upstairs. Any questions, please call ED RN Yuliana at extension [phone].     Patient Covid vaccination status: Fully vaccinated     COVID Test Ordered in ED: SARS-CoV-2/Flu A and B/RSV by PCR (GeneXpert)    COVID Suspicion at Admission: N/A    Running Infusions:      Mental Status/LOC at time of transport: A&Ox4    Other pertinent information:   CIWA score: N/A   NIH score:  N/A

## 2024-03-15 NOTE — ED INITIAL ASSESSMENT (HPI)
Pt ambulated into the ED with c/o abnormal results. Pt had blood work on Tuesday, Wednesday & Friday. Pt was sent by nephrologist for low sodium levels. Sodium 122 this morning.     Pt endorses weakness, dizziness & diarrhea.

## 2024-03-15 NOTE — CONSULTS
[facility] - Nephrology  Inpatient Consultation    Carmen Sears Patient Status:  Emergency    3/22/1950 MRN [id number]   Location [facility] EMERGENCY DEPARTMENT Attending Saji Pereyra MD   Hosp Day # 0 PCP Joe Roblero MD (Ramesh)     Reason for consult: Hyponatremia  Date of consultation: 3/15/2024     HISTORY OF PRESENT ILLNESS:     Carmen Sears is a 73 year old female with a medical history notable for hypertension, diabetes, IBS with diarrhea, who presents for abnormal labs. Nephrology consulted for hyponatremia.     Patient noted to have poor PO intake over the past 4 days. Not been eating or drinking anything due to concerns of IBS with diarrhea. Admits to feeling weak. Admits to diarrhea. Denies any NSAID use. Has been taking her furosemide daily along with all of other medications. Outpatient labs showed low sodium over the past couple of days. On admission, noted to have sodium of 122 and potassium 3.2. Admitted for further workup and management.     REVIEW OF SYSTEMS:     ROS as above, otherwise negative    HISTORY:     Past Medical History:   Diagnosis Date    Atherosclerosis of coronary artery     Back problem     Cancer (HCC)     Cataract     Coronary atherosclerosis     Diabetes (HCC)     Disorder of thyroid     High blood pressure     High cholesterol     IBS (irritable bowel syndrome)     Neuropathy     Osteoarthritis     Renal disorder     Sleep apnea      Past Surgical History:   Procedure Laterality Date    ANGIOGRAM  2005, 2005, moderate disease LAD D bifurcation    APPENDECTOMY      CHOLECYSTECTOMY      HYSTEROSCOPY      KNEE SURGERY      OTHER SURGICAL HISTORY      rectal tumor (w/RT and chemo),     REPAIR ROTATOR CUFF,ACUTE      TONSILLECTOMY      TREAT ECTOPIC PREG,ABD PREG       Family History   Problem Relation Age of Onset    Diabetes Father     Heart Disorder Mother         cardiac    Lipids Mother     Hypertension Mother      Lipids Sister     Hypertension Sister     Cancer Sister         uterine    Alcohol and Other Disorders Associated Brother     Cancer Brother         lung    Seizure Disorder Brother       reports that she quit smoking about 23 years ago. Her smoking use included cigarettes. She started smoking about 54 years ago. She has a 30 pack-year smoking history. She has never used smokeless tobacco. She reports that she does not drink alcohol and does not use drugs.  Allergies   Allergen Reactions    Codeine Sulfate  [Codeine]        MEDICATIONS:     No current facility-administered medications for this encounter.    (Not in a hospital admission)       PHYSICAL EXAM:     Vital Signs: /84   Pulse 79   Temp 97.4 °F (36.3 °C) (Temporal)   Resp 16   Ht 5' 7\" (1.702 m)   Wt 157 lb (71.2 kg)   LMP  (LMP Unknown)   SpO2 100%   BMI 24.59 kg/m²   Temp (24hrs), Av.4 °F (36.3 °C), Min:97.4 °F (36.3 °C), Max:97.4 °F (36.3 °C)     No intake or output data in the 24 hours ending 03/15/24 1750  Wt Readings from Last 3 Encounters:   03/15/24 157 lb (71.2 kg)   23 213 lb (96.6 kg)   22 230 lb (104.3 kg)       General: no acute distress, weak  HEENT: normocephalic, atraumatic  CV: RRR  Respiratory: no respiratory distress  Abdomen: soft, non-tender  Extremities: no edema bilaterally  Skin: no rashes on visible skin  Neuro: alert and oriented x3    LABORATORY DATA:     Lab Results   Component Value Date     (H) 03/15/2024    BUN 14 03/15/2024    BUNCREA 25.0 (H) 10/11/2021    CREATSERUM 0.87 03/15/2024    ANIONGAP 11 03/15/2024    GFRNAA 58 (L) 2018    GFRAA 67 2018    CA 10.1 03/15/2024    OSMOCALC 255 (L) 03/15/2024    ALKPHO 53 (L) 03/15/2024    AST 22 03/15/2024    ALT 30 03/15/2024    BILT 0.9 03/15/2024    TP 7.1 03/15/2024    ALB 4.2 03/15/2024    GLOBULIN 2.9 03/15/2024    AGRATIO 2.0 10/11/2016     (L) 03/15/2024    K 3.2 (L) 03/15/2024    CL 82 (L) 03/15/2024    CO2 29.0  03/15/2024     Lab Results   Component Value Date    WBC 7.5 03/15/2024    RBC 5.28 03/15/2024    HGB 15.9 03/15/2024    HCT 43.5 03/15/2024    .0 03/15/2024    MCV 82.4 03/15/2024    MCH 30.1 03/15/2024    MCHC 36.6 03/15/2024    RDW 13.2 03/15/2024    NEPRELIM 5.76 03/15/2024    NEPERCENT 76.5 03/15/2024    LYPERCENT 15.0 03/15/2024    MOPERCENT 7.4 03/15/2024    EOPERCENT 0.3 03/15/2024    BAPERCENT 0.5 03/15/2024    NE 5.76 03/15/2024    LYMABS 1.13 03/15/2024    MOABSO 0.56 03/15/2024    EOABSO 0.02 03/15/2024    BAABSO 0.04 03/15/2024     Lab Results   Component Value Date    MALBP 4.92 08/18/2023    CREUR 27.60 08/18/2023    CREUR 27.60 08/18/2023     Lab Results   Component Value Date    COLORUR Colorless (A) 08/18/2023    CLARITY Clear 08/18/2023    SPECGRAVITY <1.005 (L) 08/18/2023    GLUUR 500 (A) 08/18/2023    BILUR Negative 08/18/2023    KETUR 10 (A) 08/18/2023    BLOODURINE Trace (A) 08/18/2023    PHURINE 5.5 08/18/2023    PROUR Negative 08/18/2023    UROBILINOGEN Normal 08/18/2023    NITRITE Negative 08/18/2023    LEUUR 75 (A) 08/18/2023    WBCUR 1-5 08/18/2023    RBCUR 0-2 08/18/2023    EPIUR Few (A) 08/18/2023    BACUR Rare (A) 08/18/2023       IMAGING:     Reviewed    ASSESSMENT:     # Severe Hyponatremia  -Likely hypovolemic    # Hypokalemia    PLAN:     S/p 1L bolus in ED    -Will obtain urinalysis, urine lytes  -Repeat BMP at 2000  -Goal sodium in next 24 hours should be 128-130 mEq/L  -Please page nephrology if sodium <122 or >130  -Will check TSH and cortisol levels  -Hold home diuretics  -Replace potassium  -Avoid nephrotoxins and renally dose medications for creatinine clearance  -Monitor intake and output daily  -Daily weights            We will continue to follow.    Thank you for allowing us to participate in the care of this patient.         Carmela Dickson, DO  [facility] - Nephrology

## 2024-03-15 NOTE — ED PROVIDER NOTES
Patient Seen in: [facility] Emergency Department      History     Chief Complaint   Patient presents with    Abnormal Result     Stated Complaint: abnormal labs, low NA    Subjective:     HPI    73-year-old woman with history of non-insulin-dependent diabetes and hypertension to the emergency department for hyponatremia.  Patient is on furosemide and Mounjaro.  She has not been eating or drinking.  Feels very weak.  She has had some loose stools and nausea.  No vomiting.  No abdominal pain.  Her sodium level was found to be low 2 days ago and getting lower.  She was sent to the emergency department emergency department by nephrology.      Objective:   Past Medical History:   Diagnosis Date    Atherosclerosis of coronary artery     Back problem     Cancer (HCC)     Cataract     Coronary atherosclerosis     Diabetes (HCC)     Disorder of thyroid     High blood pressure     High cholesterol     IBS (irritable bowel syndrome)     Neuropathy     Osteoarthritis     Renal disorder     Sleep apnea               Past Surgical History:   Procedure Laterality Date    ANGIOGRAM  , 2005, 2005, moderate disease LAD D bifurcation    APPENDECTOMY      CHOLECYSTECTOMY      HYSTEROSCOPY      KNEE SURGERY      OTHER SURGICAL HISTORY      rectal tumor (w/RT and chemo),     REPAIR ROTATOR CUFF,ACUTE      TONSILLECTOMY      TREAT ECTOPIC PREG,ABD PREG                  Social History     Socioeconomic History    Marital status:    Tobacco Use    Smoking status: Former     Packs/day: 1.00     Years: 30.00     Additional pack years: 0.00     Total pack years: 30.00     Types: Cigarettes     Start date:      Quit date: 2000     Years since quittin.6    Smokeless tobacco: Never   Vaping Use    Vaping Use: Never used   Substance and Sexual Activity    Alcohol use: No     Alcohol/week: 0.0 standard drinks of alcohol    Drug use: No     Social Determinants of Health     Food Insecurity: No Food  Insecurity (3/15/2024)    Food Insecurity     Food Insecurity: Never true   Transportation Needs: No Transportation Needs (3/15/2024)    Transportation Needs     Lack of Transportation: No   Housing Stability: Low Risk  (3/15/2024)    Housing Stability     Housing Instability: No              Review of Systems    Positive for stated complaint: abnormal labs, low NA  Other systems are as noted in HPI.  Constitutional and vital signs reviewed.      All other systems reviewed and negative except as noted above.    Physical Exam     ED Triage Vitals [03/15/24 1350]   /80   Pulse 86   Resp 18   Temp 97.4 °F (36.3 °C)   Temp src Temporal   SpO2 97 %   O2 Device None (Room air)       Current:/65 (BP Location: Left arm)   Pulse 83   Temp 98 °F (36.7 °C) (Oral)   Resp 18   Ht 170.2 cm (5' 7\")   Wt 71.4 kg   LMP  (LMP Unknown)   SpO2 99%   BMI 24.67 kg/m²       General:  Vitals as listed.  No acute distress   HEENT: Sclerae anicteric.  Conjunctivae show no pallor.  Oropharynx clear, mucous membranes dry  Lungs: good air exchange and clear   Heart: regular rate rhythm and no murmur   Abdomen: Soft and nontender.  No abdominal masses.  No peritoneal signs   Extremities: no edema, normal peripheral pulses   Neuro: Alert oriented and nonfocal    ED Course     Labs Reviewed   COMP METABOLIC PANEL (14) - Abnormal; Notable for the following components:       Result Value    Glucose 109 (*)     Sodium 122 (*)     Potassium 3.2 (*)     Chloride 82 (*)     Calculated Osmolality 255 (*)     Alkaline Phosphatase 53 (*)     All other components within normal limits   URINALYSIS, ROUTINE - Abnormal; Notable for the following components:    Urine Color Colorless (*)     Spec Gravity <1.005 (*)     Leukocyte Esterase Urine 250 (*)     WBC Urine 11-20 (*)     RBC Urine 6-10 (*)     Bacteria Urine Rare (*)     Hyaline Casts Present (*)     All other components within normal limits   BASIC METABOLIC PANEL (8) - Abnormal;  Notable for the following components:    Sodium 124 (*)     Chloride 87 (*)     Calculated Osmolality 257 (*)     All other components within normal limits   OSMOLALITY, URINE - Abnormal; Notable for the following components:    Osmolality Urine 155 (*)     All other components within normal limits   POCT GLUCOSE - Normal   SARS-COV-2/FLU A AND B/RSV BY PCR (GENEXPERT) - Normal    Narrative:     This test is intended for the qualitative detection and differentiation of SARS-CoV-2, influenza A, influenza B, and respiratory syncytial virus (RSV) viral RNA in nasopharyngeal or nares swabs from individuals suspected of respiratory viral infection consistent with COVID-19 by their healthcare provider. Signs and symptoms of respiratory viral infection due to SARS-CoV-2, influenza, and RSV can be similar.    Test performed using the Xpert Xpress SARS-CoV-2/FLU/RSV (real time RT-PCR)  assay on the [employer] instrument, [employer], [place] [address].   This test is being used under the Food and Drug Administration's Emergency Use Authorization.    The authorized Fact Sheet for Healthcare Providers for this assay is available upon request from the laboratory.   CBC WITH DIFFERENTIAL WITH PLATELET    Narrative:     The following orders were created for panel order CBC With Differential With Platelet.  Procedure                               Abnormality         Status                     ---------                               -----------         ------                     CBC W/ DIFFERENTIAL[[id number]]                              Final result                 Please view results for these tests on the individual orders.   SODIUM, URINE, RANDOM   POTASSIUM, URINE, RANDOM   TSH W REFLEX TO FREE T4   CORTISOL   MAGNESIUM   COMP METABOLIC PANEL (14)   CBC, PLATELET; NO DIFFERENTIAL   GI STOOL PANEL BY PCR   C. DIFFICILE(TOXIGENIC)PCR   CBC W/ DIFFERENTIAL       ED COURSE and MDM       I reviewed prior external notes including  sodium that was done 3/15/2024 at 9 AM, which was  122    Hydrated with normal saline    [facility] hospitalist notified    I have discussed with the patient the results of testing, differential diagnosis, and treatment plan. They expressed clear understanding of these instructions and agrees to the plan provided.    Disposition and Plan     Clinical Impression:  1. Hyponatremia    2. Weakness generalized         Disposition:  There is no disposition on file for this visit.  There is no disposition time on file for this visit.    Follow-up:  No follow-up provider specified.      Medications Prescribed:  Current Discharge Medication List

## 2024-03-16 LAB
ALBUMIN SERPL-MCNC: 3.5 G/DL (ref 3.4–5)
ALBUMIN/GLOB SERPL: 1.5 {RATIO} (ref 1–2)
ALP LIVER SERPL-CCNC: 42 U/L
ALT SERPL-CCNC: 23 U/L
ANION GAP SERPL CALC-SCNC: 10 MMOL/L (ref 0–18)
AST SERPL-CCNC: 17 U/L (ref 15–37)
BILIRUB SERPL-MCNC: 0.6 MG/DL (ref 0.1–2)
BUN BLD-MCNC: 10 MG/DL (ref 9–23)
CALCIUM BLD-MCNC: 9.5 MG/DL (ref 8.5–10.1)
CHLORIDE SERPL-SCNC: 94 MMOL/L (ref 98–112)
CO2 SERPL-SCNC: 26 MMOL/L (ref 21–32)
CORTIS SERPL-MCNC: 10 UG/DL
CREAT BLD-MCNC: 0.54 MG/DL
EGFRCR SERPLBLD CKD-EPI 2021: 97 ML/MIN/1.73M2 (ref 60–?)
ERYTHROCYTE [DISTWIDTH] IN BLOOD BY AUTOMATED COUNT: 13.4 %
GLOBULIN PLAS-MCNC: 2.4 G/DL (ref 2.8–4.4)
GLUCOSE BLD-MCNC: 102 MG/DL (ref 70–99)
GLUCOSE BLD-MCNC: 111 MG/DL (ref 70–99)
GLUCOSE BLD-MCNC: 114 MG/DL (ref 70–99)
GLUCOSE BLD-MCNC: 80 MG/DL (ref 70–99)
GLUCOSE BLD-MCNC: 88 MG/DL (ref 70–99)
HCT VFR BLD AUTO: 39.7 %
HGB BLD-MCNC: 14.2 G/DL
MAGNESIUM SERPL-MCNC: 1.2 MG/DL (ref 1.6–2.6)
MCH RBC QN AUTO: 30.4 PG (ref 26–34)
MCHC RBC AUTO-ENTMCNC: 35.8 G/DL (ref 31–37)
MCV RBC AUTO: 85 FL
OSMOLALITY SERPL CALC.SUM OF ELEC: 268 MOSM/KG (ref 275–295)
PLATELET # BLD AUTO: 144 10(3)UL (ref 150–450)
POTASSIUM SERPL-SCNC: 3.1 MMOL/L (ref 3.5–5.1)
PROT SERPL-MCNC: 5.9 G/DL (ref 6.4–8.2)
RBC # BLD AUTO: 4.67 X10(6)UL
SODIUM SERPL-SCNC: 125 MMOL/L (ref 136–145)
SODIUM SERPL-SCNC: 130 MMOL/L (ref 136–145)
TSI SER-ACNC: 0.57 MIU/ML (ref 0.36–3.74)
WBC # BLD AUTO: 5.6 X10(3) UL (ref 4–11)

## 2024-03-16 PROCEDURE — 83735 ASSAY OF MAGNESIUM: CPT | Performed by: HOSPITALIST

## 2024-03-16 PROCEDURE — 84443 ASSAY THYROID STIM HORMONE: CPT | Performed by: STUDENT IN AN ORGANIZED HEALTH CARE EDUCATION/TRAINING PROGRAM

## 2024-03-16 PROCEDURE — 80053 COMPREHEN METABOLIC PANEL: CPT | Performed by: HOSPITALIST

## 2024-03-16 PROCEDURE — 82533 TOTAL CORTISOL: CPT | Performed by: STUDENT IN AN ORGANIZED HEALTH CARE EDUCATION/TRAINING PROGRAM

## 2024-03-16 PROCEDURE — 85027 COMPLETE CBC AUTOMATED: CPT | Performed by: HOSPITALIST

## 2024-03-16 PROCEDURE — 97161 PT EVAL LOW COMPLEX 20 MIN: CPT

## 2024-03-16 PROCEDURE — 82962 GLUCOSE BLOOD TEST: CPT

## 2024-03-16 PROCEDURE — 97530 THERAPEUTIC ACTIVITIES: CPT

## 2024-03-16 PROCEDURE — 84295 ASSAY OF SERUM SODIUM: CPT | Performed by: INTERNAL MEDICINE

## 2024-03-16 RX ORDER — MAGNESIUM OXIDE 400 MG/1
800 TABLET ORAL ONCE
Status: COMPLETED | OUTPATIENT
Start: 2024-03-16 | End: 2024-03-16

## 2024-03-16 RX ORDER — DEXTROSE MONOHYDRATE 50 MG/ML
INJECTION, SOLUTION INTRAVENOUS CONTINUOUS
Status: DISCONTINUED | OUTPATIENT
Start: 2024-03-16 | End: 2024-03-16

## 2024-03-16 RX ORDER — ACETAMINOPHEN 500 MG
500 TABLET ORAL EVERY 4 HOURS PRN
Status: DISCONTINUED | OUTPATIENT
Start: 2024-03-16 | End: 2024-03-18

## 2024-03-16 RX ORDER — POTASSIUM CHLORIDE 20 MEQ/1
40 TABLET, EXTENDED RELEASE ORAL ONCE
Status: COMPLETED | OUTPATIENT
Start: 2024-03-16 | End: 2024-03-16

## 2024-03-16 RX ORDER — AMLODIPINE BESYLATE 5 MG/1
5 TABLET ORAL DAILY
Status: DISCONTINUED | OUTPATIENT
Start: 2024-03-16 | End: 2024-03-18

## 2024-03-16 NOTE — H&P
[facility] Hospitalist History and Physical      Chief Complaint   Patient presents with    Abnormal Result        PCP: Joe Roblero MD (Ramesh)      History of Present Illness: Patient is a 73 year old female with PMH sig for HTN, IBS, DM II, hypothyroidism, and peripheral neuropathy who presented to the ED for evaluation of weakness and dizziness.  She was found on 3/13 to have a sodium low at 129.  Pt has not been eating drinking well, has had loose stools and nausea.  Over she feels very weak.  No abd pain but she has chronic diarrhea, has occasional vomiting.   She was directed to the ED by renal.  She lives at home independently with her .     In the ED, Na low at 122.   UA with LE, WBC, RBC, and bacteria.  1 L NS was given.    On my evaluation, pt states she is feeling better, less lightheaded.      Past Medical History:   Diagnosis Date    Atherosclerosis of coronary artery     Back problem     Cancer (HCC)     Cataract     Coronary atherosclerosis     Diabetes (HCC)     Disorder of thyroid     High blood pressure     High cholesterol     IBS (irritable bowel syndrome)     Neuropathy     Osteoarthritis     Renal disorder     Sleep apnea       Past Surgical History:   Procedure Laterality Date    ANGIOGRAM  2001, 2005 June 2001, Sept 2005, moderate disease LAD D bifurcation    APPENDECTOMY      CHOLECYSTECTOMY      HYSTEROSCOPY      KNEE SURGERY      OTHER SURGICAL HISTORY      rectal tumor (w/RT and chemo),     REPAIR ROTATOR CUFF,ACUTE      TONSILLECTOMY      TREAT ECTOPIC PREG,ABD PREG          ALL:  Allergies   Allergen Reactions    Codeine Sulfate  [Codeine]         No current facility-administered medications on file prior to encounter.     Current Outpatient Medications on File Prior to Encounter   Medication Sig Dispense Refill    amLODIPine 5 MG Oral Tab Take 1 tablet (5 mg total) by mouth daily.      furosemide 20 MG Oral Tab Take 1 tablet (20 mg total) by mouth daily.       lisinopril 10 MG Oral Tab Take 1 tablet (10 mg total) by mouth daily.      metFORMIN 500 MG Oral Tab Take 1 tablet (500 mg total) by mouth 2 (two) times daily with meals.      levothyroxine 88 MCG Oral Tab Take 1 tablet (88 mcg total) by mouth before breakfast. 6 days week (Mon thru Sat)      Tirzepatide (MOUNJARO) 7.5 MG/0.5ML Subcutaneous Solution Pen-injector Inject 10 mg into the skin once a week.      Potassium Chloride ER 10 MEQ Oral Tab CR Take 1 tablet (10 mEq total) by mouth 2 (two) times daily.      Loperamide HCl 1 MG/5ML Oral Liquid Take by mouth 4 (four) times daily as needed for Diarrhea.      METOPROLOL TARTRATE 50 MG Oral Tab TAKE 1 TABLET(50 MG) BY MOUTH TWICE DAILY 180 tablet 3    rosuvastatin 20 MG Oral Tab Take 1 tablet (20 mg total) by mouth daily. 90 tablet 3    Glucose Blood In Vitro Strip Twice a day 200 each 1    OMEPRAZOLE 20 MG Oral Capsule Delayed Release TAKE 1 CAPSULE BY MOUTH DAILY 90 capsule 0    aspirin 81 MG Oral Tab Take 1 tablet (81 mg total) by mouth daily.      Omega-3 Fatty Acids (FISH OIL OR) Take by mouth daily.      Fexofenadine HCl (ALLEGRA OR)  (Patient not taking: Reported on 3/15/2024)      Dapagliflozin-metFORMIN HCl ER (XIGDUO XR) 5-1000 MG Oral Tablet 24 Hr Take 1 tablet by mouth daily. (Patient not taking: Reported on 3/15/2024) 90 tablet 1    Ascorbic Acid (VITAMIN C) 1000 MG Oral Tab Take 1 tablet (1,000 mg total) by mouth daily. (Patient not taking: Reported on 3/15/2024)           Social History     Tobacco Use    Smoking status: Former     Packs/day: 1.00     Years: 30.00     Additional pack years: 0.00     Total pack years: 30.00     Types: Cigarettes     Start date:      Quit date: 2000     Years since quittin.6    Smokeless tobacco: Never   Substance Use Topics    Alcohol use: No     Alcohol/week: 0.0 standard drinks of alcohol        Fam Hx  Family History   Problem Relation Age of Onset    Diabetes Father     Heart Disorder Mother          cardiac    Lipids Mother     Hypertension Mother     Lipids Sister     Hypertension Sister     Cancer Sister         uterine    Alcohol and Other Disorders Associated Brother     Cancer Brother         lung    Seizure Disorder Brother        Review of Systems  Comprehensive ROS reviewed and negative except for what is stated in HPI.      OBJECTIVE:  /78 (BP Location: Left arm)   Pulse 79   Temp 98.4 °F (36.9 °C) (Oral)   Resp 20   Ht 5' 7\" (1.702 m)   Wt 157 lb 8 oz (71.4 kg)   LMP  (LMP Unknown)   SpO2 100%   BMI 24.67 kg/m²   Gen: No acute distress, alert and oriented x3, no focal neurologic deficits  HEENT:  EOMI, PERRLA, OP clear, MMM  Pulm: Lungs clear bilaterally, normal respiratory effort  CV: Heart with regular rate and rhythm, no murmur.  Normal PMI.    Abd: Abdomen soft, nontender, nondistended, no organomegaly, bowel sounds present  MSK: Full range of motion in extremities, no clubbing, no cyanosis  Skin: no rashes or lesions  Neuro:  Grossly intact, no focal deficits      Data Review:    LABS:   Lab Results   Component Value Date    WBC 5.6 03/16/2024    HGB 14.2 03/16/2024    HCT 39.7 03/16/2024    .0 03/16/2024    CREATSERUM 0.75 03/15/2024    BUN 13 03/15/2024     03/15/2024    K 3.6 03/15/2024    CL 87 03/15/2024    CO2 28.0 03/15/2024    GLU 87 03/15/2024    CA 9.6 03/15/2024    ALB 4.2 03/15/2024    ALKPHO 53 03/15/2024    BILT 0.9 03/15/2024    TP 7.1 03/15/2024    AST 22 03/15/2024    ALT 30 03/15/2024    PGLU 80 03/16/2024       Radiology: No results found.     Assessment/Plan:     73 yr old female with PMH sig for HTN, IBS, DM II, hypothyroidism, and peripheral neuropathy who presented to the ED for evaluation of weakness and dizziness.    # Severe hyponatremia   - likely hypovolemic   - s/p 1 L NS  - hold home diuretics   - monitor Na closely   - renal c/s appreciated     # Essential HTN  - hold lasix due to hyponatremia   - cont amlodipine, metoprolol  - resume  lisinopril in AM    # HLD  - cont statin    # IBS  - with chronic diarrhea   - monitored as outpt    # DM II with hyperglycemia   - hold po DM meds  - monitor sugars, no need for insulin     # Hypothyroidism   - cont levothyroxine     # GERD  - cont PPI    # Bacteruria with pyuria  - no clinical evidence of UTI  - monitor     DVT prophy - hep subcutaneous   Dispo: inpt care.  POC d/w pt who agrees.      Outpatient records or previous hospital records reviewed.   [facility] hospitalist to continue to follow patient while in house  A total of 76 minutes taken with patient and coordinating care.  Greater than 50% face to face encounter.      Advanced Care Planning  While discussing goals of care with pt, Jackie voluntarily participated in an advanced care planning discussion.  The following was discussed: POA and code status.  She confirms that her  [facility] has her healthcare POA.  She confirms FULL CODE status.   17 minutes were spent discussing advanced care planning.  This time was exclusive of the documented time for this visit.     Krishna Mcenal DO  [facility] Hospitalist

## 2024-03-16 NOTE — PHYSICAL THERAPY NOTE
PHYSICAL THERAPY EVALUATION - INPATIENT     Room Number: 402/402-A  Evaluation Date: 3/16/2024  Type of Evaluation: Initial  Physician Order: PT Eval and Treat    Presenting Problem: Hyponatermia  Co-Morbidities : HTN, DM, IBS with diarrhea, hypothyroidism  Reason for Therapy: Mobility Dysfunction and Discharge Planning    PHYSICAL THERAPY ASSESSMENT   Patient is currently functioning near baseline with bed mobility, transfers, and gait.  Prior to admission, patient's baseline is IND with all ADLs and mobility.  Patient is requiring stand-by assist as a result of the following impairments: decreased muscular endurance and medical status.  Physical Therapy will continue to follow for duration of hospitalization.    Patient will benefit from continued skilled PT Services For duration of hospitalization, however, given the patient is functioning near baseline level do not anticipate skilled therapy needs at discharge .    PLAN  PT Treatment Plan: Gait training;Patient education;Strengthening;Transfer training;Balance training  Rehab Potential : Good  Frequency (Obs): 3-5x/week  Number of Visits to Meet Established Goals: 3      CURRENT GOALS    Goal #1 Patient is able to ambulate 150 feet with assist device: Least Restrictive Device at assistance level: modified independent     Goal #2      Goal #3      Goal #4    Goal #5    Goal #6    Goal Comments: Goals established on 3/16/2024      PHYSICAL THERAPY MEDICAL/SOCIAL HISTORY  History related to current admission: Patient is a 73 year old female admitted on 3/15/2024 from Home for abnormal Blood results, weakness an dizziness.  Pt diagnosed with Hyponatremia.      HOME SITUATION  Type of Home: House   Home Layout: One level                Lives With: Spouse  Drives: Yes  Patient Owned Equipment: None  Patient Regularly Uses: Glasses    Prior Level of [place]: Pt states that she lives in a house with her spouse. Pt reports that she is IND with all ADLs and  does not use a assisted device for gait. Pt reports no history of falls.    SUBJECTIVE  \"I just feel dizzy\"      OBJECTIVE  Precautions: None  Fall Risk: Standard fall risk    WEIGHT BEARING RESTRICTION  Weight Bearing Restriction: None                PAIN ASSESSMENT  Ratin  Location: Pt reported no pain       COGNITION  Overall Cognitive Status:  WFL - within functional limits    RANGE OF MOTION AND STRENGTH ASSESSMENT  Upper extremity ROM and strength are within functional limits     Lower extremity ROM is within functional limits     Lower extremity strength is within functional limits       BALANCE  Static Sitting: Good  Dynamic Sitting: Good  Static Standing: Good  Dynamic Standing: Good    ADDITIONAL TESTS                                    ACTIVITY TOLERANCE                         O2 WALK       NEUROLOGICAL FINDINGS                        AM-PAC '6-Clicks' INPATIENT SHORT FORM - BASIC MOBILITY  How much difficulty does the patient currently have...  Patient Difficulty: Turning over in bed (including adjusting bedclothes, sheets and blankets)?: None   Patient Difficulty: Sitting down on and standing up from a chair with arms (e.g., wheelchair, bedside commode, etc.): None   Patient Difficulty: Moving from lying on back to sitting on the side of the bed?: None   How much help from another person does the patient currently need...   Help from Another: Moving to and from a bed to a chair (including a wheelchair)?: None   Help from Another: Need to walk in hospital room?: A Little   Help from Another: Climbing 3-5 steps with a railing?: A Little       AM-PAC Score:  Raw Score: 22   Approx Degree of Impairment: 20.91%   Standardized Score (AM-PAC Scale): 53.28   CMS Modifier (G-Code): CJ    FUNCTIONAL ABILITY STATUS  Gait Assessment   Functional Mobility/Gait Assessment  Gait Assistance: Not tested    Skilled Therapy Provided     Bed Mobility:  Rolling: NT  Supine to sit: NT   Sit to supine: IND     Transfer  Mobility:  Sit to stand: Mod I. Pt was given verbal and tactile cues for proper hand placement to push up from stable surface.   Stand to sit: Mod I  Gait = NT    Therapist's Comments: Pt was sitting at the EOB at the start of the session. Pt's Blood pressure was monitored during session with starting BP at 133/131 sitting, standing 123/87 and sitting 146/78. During session pt reported feeling symptomatic with dizziness and lightheadedness. Pt was educated to use the RW when ambulating to the washroom as well as received supervision from staff.     Exercise/Education Provided:  Bed mobility  Body mechanics  Gait training  Transfer training    Patient End of Session: In bed;Needs met;Call light within reach;RN aware of session/findings;All patient questions and concerns addressed      Patient Evaluation Complexity Level:  History Low - no personal factors and/or co-morbidities   Examination of body systems Low - addressing 1-2 elements   Clinical Presentation Low - Stable   Clinical Decision Making Low - Stable       PT Session Time: 23 minutes  Therapeutic Activity: 15 minutes

## 2024-03-16 NOTE — PROGRESS NOTES
Nephrology Progress Note    Carmen Sears Attending:  Krishna Mcneal DO       SUBJECTIVE:     No acute events  SNa trend up     PHYSICAL EXAM:     Vital Signs: /84 (BP Location: Left arm)   Pulse 80   Temp 98.7 °F (37.1 °C) (Oral)   Resp 17   Ht 5' 7\" (1.702 m)   Wt 157 lb 8 oz (71.4 kg)   LMP  (LMP Unknown)   SpO2 99%   BMI 24.67 kg/m²   Temp (24hrs), Av.3 °F (36.8 °C), Min:97.4 °F (36.3 °C), Max:98.9 °F (37.2 °C)       Intake/Output Summary (Last 24 hours) at 3/16/2024 1336  Last data filed at 3/16/2024 1255  Gross per 24 hour   Intake 1042 ml   Output 0 ml   Net 1042 ml     Wt Readings from Last 3 Encounters:   03/15/24 157 lb 8 oz (71.4 kg)   23 213 lb (96.6 kg)   22 230 lb (104.3 kg)       General: pleasant, well appearing  HEENT: NCAT  Neck: Supple  Cardiac: Regular rate and rhythm, no murmurs  Lungs: CTAB  Abdomen: Soft, non-tender, non-distended  Extremities: no LE edema  Neurologic/Psych: mentating well       LABORATORY DATA:     Lab Results   Component Value Date    GLU 88 2024    BUN 10 2024    BUNCREA 25.0 (H) 10/11/2021    CREATSERUM 0.54 (L) 2024    ANIONGAP 10 2024    GFRNAA 58 (L) 2018    GFRAA 67 2018    CA 9.5 2024    OSMOCALC 268 (L) 2024    ALKPHO 42 (L) 2024    AST 17 2024    ALT 23 2024    BILT 0.6 2024    TP 5.9 (L) 2024    ALB 3.5 2024    GLOBULIN 2.4 (L) 2024    AGRATIO 2.0 10/11/2016     (L) 2024    K 3.1 (L) 2024    CL 94 (L) 2024    CO2 26.0 2024     Lab Results   Component Value Date    WBC 5.6 2024    RBC 4.67 2024    HGB 14.2 2024    HCT 39.7 2024    .0 (L) 2024    MCV 85.0 2024    MCH 30.4 2024    MCHC 35.8 2024    RDW 13.4 2024    NEPRELIM 5.76 03/15/2024    NEPERCENT 76.5 03/15/2024    LYPERCENT 15.0 03/15/2024    MOPERCENT 7.4 03/15/2024    EOPERCENT 0.3 03/15/2024     BAPERCENT 0.5 03/15/2024    NE 5.76 03/15/2024    LYMABS 1.13 03/15/2024    MOABSO 0.56 03/15/2024    EOABSO 0.02 03/15/2024    BAABSO 0.04 03/15/2024     Lab Results   Component Value Date    MALBP 4.92 08/18/2023    CREUR 27.60 08/18/2023    CREUR 27.60 08/18/2023     Lab Results   Component Value Date    COLORUR Colorless (A) 03/15/2024    CLARITY Clear 03/15/2024    SPECGRAVITY <1.005 (L) 03/15/2024    GLUUR Normal 03/15/2024    BILUR Negative 03/15/2024    KETUR Negative 03/15/2024    BLOODURINE Negative 03/15/2024    PHURINE 5.0 03/15/2024    PROUR Negative 03/15/2024    UROBILINOGEN Normal 03/15/2024    NITRITE Negative 03/15/2024    LEUUR 250 (A) 03/15/2024    WBCUR 11-20 (A) 03/15/2024    RBCUR 6-10 (A) 03/15/2024    EPIUR None Seen 03/15/2024    BACUR Rare (A) 03/15/2024    HYLUR Present (A) 03/15/2024         IMAGING:     New studies reviewed    MEDICATIONS:       Current Facility-Administered Medications   Medication Dose Route Frequency    acetaminophen (Tylenol Extra Strength) tab 500 mg  500 mg Oral Q4H PRN    amLODIPine (Norvasc) tab 5 mg  5 mg Oral Daily    dextrose 5% infusion   Intravenous Continuous    aspirin DR tab 81 mg  81 mg Oral Daily    levothyroxine (Synthroid) tab 88 mcg  88 mcg Oral Daily @ 0700    metoprolol tartrate (Lopressor) tab 50 mg  50 mg Oral BID    pantoprazole (Protonix) DR tab 40 mg  40 mg Oral QAM AC    rosuvastatin (Crestor) tab 20 mg  20 mg Oral Nightly    heparin (Porcine) 5000 UNIT/ML injection 5,000 Units  5,000 Units Subcutaneous 2 times per day       ASSESSMENT:      # Severe Hyponatremia  -Likely hypovolemic  -TSH, cortisol OK      # Hypokalemia      PLAN:      -Risk for overcorrection.  Start D5W.  Repeat evening SNa level, page nephrology with results.   -Hold home diuretics  -Replace potassium per protocol         Hank Conti DO  [facility]

## 2024-03-16 NOTE — PROGRESS NOTES
Started on ivf at reg rates,Denies any pain or discomfort,Has   Fair appetite,Seen by physical therapist,Ambulates w/ steady gait,  Seen by renal Md.Sched meds given and tolerated well,Assisted needs.

## 2024-03-16 NOTE — PLAN OF CARE
NURSING ADMISSION NOTE      Patient admitted via Cart  A&Ox4, VSS and afebrile  SBA d/t dizziness.    on RA  On Tele  Continent x2  Accu check QID    Still need stool sample for testing    PMH: DM type 2, peripheral neuropathy, IBS, anemia, HLD, HTN, hypothyroidism, acute renal failure, VIRAJ (np cpap), hyponatremia and hypokalemia.     Consult: nephrology    Oriented to room.  Safety precautions initiated.  Bed in low position.  Call light in reach.

## 2024-03-17 LAB
ANION GAP SERPL CALC-SCNC: 6 MMOL/L (ref 0–18)
BUN BLD-MCNC: 6 MG/DL (ref 9–23)
CALCIUM BLD-MCNC: 9.5 MG/DL (ref 8.5–10.1)
CHLORIDE SERPL-SCNC: 96 MMOL/L (ref 98–112)
CO2 SERPL-SCNC: 27 MMOL/L (ref 21–32)
CREAT BLD-MCNC: 0.6 MG/DL
EGFRCR SERPLBLD CKD-EPI 2021: 95 ML/MIN/1.73M2 (ref 60–?)
GLUCOSE BLD-MCNC: 101 MG/DL (ref 70–99)
GLUCOSE BLD-MCNC: 82 MG/DL (ref 70–99)
GLUCOSE BLD-MCNC: 88 MG/DL (ref 70–99)
GLUCOSE BLD-MCNC: 90 MG/DL (ref 70–99)
GLUCOSE BLD-MCNC: 97 MG/DL (ref 70–99)
MAGNESIUM SERPL-MCNC: 1.3 MG/DL (ref 1.6–2.6)
OSMOLALITY SERPL CALC.SUM OF ELEC: 265 MOSM/KG (ref 275–295)
POTASSIUM SERPL-SCNC: 4 MMOL/L (ref 3.5–5.1)
SODIUM SERPL-SCNC: 129 MMOL/L (ref 136–145)

## 2024-03-17 PROCEDURE — 82962 GLUCOSE BLOOD TEST: CPT

## 2024-03-17 PROCEDURE — 84132 ASSAY OF SERUM POTASSIUM: CPT | Performed by: INTERNAL MEDICINE

## 2024-03-17 PROCEDURE — 83735 ASSAY OF MAGNESIUM: CPT | Performed by: INTERNAL MEDICINE

## 2024-03-17 PROCEDURE — 80048 BASIC METABOLIC PNL TOTAL CA: CPT | Performed by: INTERNAL MEDICINE

## 2024-03-17 RX ORDER — SODIUM CHLORIDE 9 MG/ML
INJECTION, SOLUTION INTRAVENOUS CONTINUOUS
Status: ACTIVE | OUTPATIENT
Start: 2024-03-17 | End: 2024-03-17

## 2024-03-17 RX ORDER — LISINOPRIL 10 MG/1
10 TABLET ORAL DAILY
Status: DISCONTINUED | OUTPATIENT
Start: 2024-03-17 | End: 2024-03-18

## 2024-03-17 NOTE — PLAN OF CARE
Problem: CARDIOVASCULAR - ADULT  Goal: Maintains optimal cardiac output and hemodynamic stability  Description: INTERVENTIONS:  - Monitor vital signs, rhythm, and trends  - Monitor for bleeding, hypotension and signs of decreased cardiac output  - Evaluate effectiveness of vasoactive medications to optimize hemodynamic stability  - Monitor arterial and/or venous puncture sites for bleeding and/or hematoma  - Assess quality of pulses, skin color and temperature  - Assess for signs of decreased coronary artery perfusion - ex. Angina  - Evaluate fluid balance, assess for edema, trend weights  Outcome: Progressing  Goal: Absence of cardiac arrhythmias or at baseline  Description: INTERVENTIONS:  - Continuous cardiac monitoring, monitor vital signs, obtain 12 lead EKG if indicated  - Evaluate effectiveness of antiarrhythmic and heart rate control medications as ordered  - Initiate emergency measures for life threatening arrhythmias  - Monitor electrolytes and administer replacement therapy as ordered  Outcome: Progressing     Problem: GASTROINTESTINAL - ADULT  Goal: Maintains or returns to baseline bowel function  Description: INTERVENTIONS:  - Assess bowel function  - Maintain adequate hydration with IV or PO as ordered and tolerated  - Evaluate effectiveness of GI medications  - Encourage mobilization and activity  - Obtain nutritional consult as needed  - Establish a toileting routine/schedule  - Consider collaborating with pharmacy to review patient's medication profile  Outcome: Progressing  Goal: Maintains adequate nutritional intake (undernourished)  Description: INTERVENTIONS:  - Monitor percentage of each meal consumed  - Identify factors contributing to decreased intake, treat as appropriate  - Assist with meals as needed  - Monitor I&O, WT and lab values  - Obtain nutritional consult as needed  - Optimize oral hygiene and moisture  - Encourage food from home; allow for food preferences  - Enhance eating  environment  Outcome: Progressing     Problem: METABOLIC/FLUID AND ELECTROLYTES - ADULT  Goal: Glucose maintained within prescribed range  Description: INTERVENTIONS:  - Monitor Blood Glucose as ordered  - Assess for signs and symptoms of hyperglycemia and hypoglycemia  - Administer ordered medications to maintain glucose within target range  - Assess barriers to adequate nutritional intake and initiate nutrition consult as needed  - Instruct patient on self management of diabetes  Outcome: Progressing  Goal: Electrolytes maintained within normal limits  Description: INTERVENTIONS:  - Monitor labs and rhythm and assess patient for signs and symptoms of electrolyte imbalances  - Administer electrolyte replacement as ordered  - Monitor response to electrolyte replacements, including rhythm and repeat lab results as appropriate  - Fluid restriction as ordered  - Instruct patient on fluid and nutrition restrictions as appropriate  Outcome: Progressing  Goal: Hemodynamic stability and optimal renal function maintained  Description: INTERVENTIONS:  - Monitor labs and assess for signs and symptoms of volume excess or deficit  - Monitor intake, output and patient weight  - Monitor urine specific gravity, serum osmolarity and serum sodium as indicated or ordered  - Monitor response to interventions for patient's volume status, including labs, urine output, blood pressure (other measures as available)  - Encourage oral intake as appropriate  - Instruct patient on fluid and nutrition restrictions as appropriate  Outcome: Progressing     Problem: Patient/Family Goals  Goal: Patient/Family Short Term Goal  Description: Patient's Short Term Goal: Have sodium level improve and be able to go home soon    Interventions:   - IV Fluids  -Nephrology consult- Ambulate with assist  - See additional Care Plan goals for specific interventions  Outcome: Progressing           Pt is alert and oriented x 4. Vitals stable. No C/o pain or  discomfort. Appetite is poor, per pt not able to eat much due to less appetite. Ambulating in the room but still feels weak. Denies dizziness at this time.  Pt Magnesium 1.3, Notified  and , Ok to replace Magnesium with electrolyte protocol. Magnesium replaced. Plan of care updated to the pt. Will continue to monitor.

## 2024-03-17 NOTE — PROGRESS NOTES
Nephrology Progress Note    Carmen Sears Attending:  Krishna Mcneal DO       SUBJECTIVE:     No acute events  SNa better  No diarrhea    PHYSICAL EXAM:     Vital Signs: /58 (BP Location: Left arm)   Pulse 67   Temp 98.3 °F (36.8 °C) (Oral)   Resp 16   Ht 5' 7\" (1.702 m)   Wt 157 lb 8 oz (71.4 kg)   LMP  (LMP Unknown)   SpO2 100%   BMI 24.67 kg/m²   Temp (24hrs), Av.3 °F (36.8 °C), Min:97.9 °F (36.6 °C), Max:98.7 °F (37.1 °C)       Intake/Output Summary (Last 24 hours) at 3/17/2024 0855  Last data filed at 3/16/2024 1533  Gross per 24 hour   Intake 240 ml   Output 0 ml   Net 240 ml     Wt Readings from Last 3 Encounters:   03/15/24 157 lb 8 oz (71.4 kg)   23 213 lb (96.6 kg)   22 230 lb (104.3 kg)       General: pleasant, well appearing  HEENT: NCAT  Neck: Supple  Cardiac: Regular rate and rhythm, no murmurs  Lungs: CTAB  Abdomen: Soft, non-tender, non-distended  Extremities: no LE edema  Neurologic/Psych: mentating well       LABORATORY DATA:     Lab Results   Component Value Date    GLU 82 2024    BUN 6 (L) 2024    BUNCREA 25.0 (H) 10/11/2021    CREATSERUM 0.60 2024    ANIONGAP 6 2024    GFRNAA 58 (L) 2018    GFRAA 67 2018    CA 9.5 2024    OSMOCALC 265 (L) 2024    ALKPHO 42 (L) 2024    AST 17 2024    ALT 23 2024    BILT 0.6 2024    TP 5.9 (L) 2024    ALB 3.5 2024    GLOBULIN 2.4 (L) 2024    AGRATIO 2.0 10/11/2016     (L) 2024    K 4.0 2024    CL 96 (L) 2024    CO2 27.0 2024     Lab Results   Component Value Date    WBC 5.6 2024    RBC 4.67 2024    HGB 14.2 2024    HCT 39.7 2024    .0 (L) 2024    MCV 85.0 2024    MCH 30.4 2024    MCHC 35.8 2024    RDW 13.4 2024    NEPRELIM 5.76 03/15/2024    NEPERCENT 76.5 03/15/2024    LYPERCENT 15.0 03/15/2024    MOPERCENT 7.4 03/15/2024    EOPERCENT 0.3  03/15/2024    BAPERCENT 0.5 03/15/2024    NE 5.76 03/15/2024    LYMABS 1.13 03/15/2024    MOABSO 0.56 03/15/2024    EOABSO 0.02 03/15/2024    BAABSO 0.04 03/15/2024     Lab Results   Component Value Date    MALBP 4.92 08/18/2023    CREUR 27.60 08/18/2023    CREUR 27.60 08/18/2023     Lab Results   Component Value Date    COLORUR Colorless (A) 03/15/2024    CLARITY Clear 03/15/2024    SPECGRAVITY <1.005 (L) 03/15/2024    GLUUR Normal 03/15/2024    BILUR Negative 03/15/2024    KETUR Negative 03/15/2024    BLOODURINE Negative 03/15/2024    PHURINE 5.0 03/15/2024    PROUR Negative 03/15/2024    UROBILINOGEN Normal 03/15/2024    NITRITE Negative 03/15/2024    LEUUR 250 (A) 03/15/2024    WBCUR 11-20 (A) 03/15/2024    RBCUR 6-10 (A) 03/15/2024    EPIUR None Seen 03/15/2024    BACUR Rare (A) 03/15/2024    HYLUR Present (A) 03/15/2024         IMAGING:     New studies reviewed          ASSESSMENT:      # Severe Hyponatremia  -Likely hypovolemic  -TSH, cortisol OK      # Hypokalemia  # Hypomagnesemia      PLAN:      -Okay to normalize serum sodium level today.  1 L 0.9 NS.  -Hold home diuretics  -Replace potassium and magnesium per protocol         Hank Conti DO  [facility]

## 2024-03-17 NOTE — PLAN OF CARE
Pt alert and oriented x4. VSS. Afebrile. No complaints of pain. Medications given per MAR. Safety precautions in place. Call light within reach.

## 2024-03-17 NOTE — PROGRESS NOTES
[facility]   part of [facility] Hospitalist Progress Note     Carmen Sears Patient Status:  Inpatient    3/22/1950 MRN [id number]   Location [facility] 4NW-A Attending Krishna Mcneal, DO   Hosp Day # 2 PCP Joe Roblero MD (Ramesh)     Follow Up:  The primary encounter diagnosis was Hyponatremia. A diagnosis of Weakness generalized was also pertinent to this visit.    Subjective:     Patient seen and examined.  States she is feeling better, stronger and eating better.  No fevers.      Objective:    Review of Systems:   10 point ROS completed and was negative, except for pertinent positive and negatives stated in subjective.    Vital signs:  Temp:  [97.9 °F (36.6 °C)-98.7 °F (37.1 °C)] 97.9 °F (36.6 °C)  Pulse:  [67-80] 74  Resp:  [16-18] 17  BP: (100-123)/(58-84) 123/68  SpO2:  [99 %-100 %] 99 %    Physical Exam:    Gen: No acute distress, alert and oriented x3, no focal neurologic deficits  HEENT:  EOMI, PERRLA, OP clear, MMM  Pulm: Lungs clear bilaterally, normal respiratory effort  CV: Heart with regular rate and rhythm, no murmur.  Normal PMI.    Abd: Abdomen soft, nontender, nondistended, no organomegaly, bowel sounds present  MSK: Full range of motion in extremities, no clubbing, no cyanosis  Skin: no rashes or lesions  Neuro:  Grossly intact, no focal deficits         Diagnostic Data:    Labs:  Recent Labs   Lab 03/15/24  1402 24  0601   WBC 7.5 5.6   HGB 15.9 14.2   MCV 82.4 85.0   .0 144.0*       Recent Labs   Lab 03/15/24  1402 03/15/24  2016 03/16/24  0602 24  1755 24  0520   * 87 88  --  82   BUN 14 13 10  --  6*   CREATSERUM 0.87 0.75 0.54*  --  0.60   CA 10.1 9.6 9.5  --  9.5   ALB 4.2  --  3.5  --   --    * 124* 130* 125* 129*   K 3.2* 3.6 3.1*  --  4.0   CL 82* 87* 94*  --  96*   CO2 29.0 28.0 26.0  --  27.0   ALKPHO 53*  --  42*  --   --    AST 22  --  17  --   --    ALT 30  --  23  --   --    BILT 0.9   --  0.6  --   --    TP 7.1  --  5.9*  --   --        Estimated Creatinine Clearance: 81.2 mL/min (based on SCr of 0.6 mg/dL).    No results for input(s): \"PTP\", \"INR\" in the last 168 hours.         COVID-19 Lab Results    COVID-19  Lab Results   Component Value Date    COVID19 Not Detected 03/15/2024       Pro-Calcitonin  No results for input(s): \"PCT\" in the last 168 hours.    Cardiac  No results for input(s): \"TROP\", \"PBNP\" in the last 168 hours.    Creatinine Kinase  No results for input(s): \"CK\" in the last 168 hours.    Inflammatory Markers  No results for input(s): \"CRP\", \"ADOLFO\", \"LDH\", \"DDIMER\" in the last 168 hours.    Imaging: Imaging data reviewed in Epic.    Medications:    magnesium sulfate  4 g Intravenous Once    amLODIPine  5 mg Oral Daily    aspirin  81 mg Oral Daily    levothyroxine  88 mcg Oral Daily @ 0700    metoprolol tartrate  50 mg Oral BID    pantoprazole  40 mg Oral QAM AC    rosuvastatin  20 mg Oral Nightly    heparin  5,000 Units Subcutaneous 2 times per day       Assessment & Plan:      73 yr old female with PMH sig for HTN, IBS, DM II, hypothyroidism, and peripheral neuropathy who presented to the ED for evaluation of weakness and dizziness.     # Severe hyponatremia   - likely hypovolemic   - s/p 1 L NS  - hold home diuretics   - monitor Na closely   - renal c/s appreciated      # Essential HTN  - hold lasix due to hyponatremia   - cont amlodipine, metoprolol  - resume lisinopril      # HLD  - cont statin     # IBS  - with chronic diarrhea   - monitored as outpt     # DM II with hyperglycemia   - hold po DM meds  - monitor sugars, no need for insulin      # Hypothyroidism   - cont levothyroxine      # GERD  - cont PPI     # Bacteruria with pyuria  - no clinical evidence of UTI  - monitor     Plan of care: inpt care.     Plan of care discussed with patient who agrees.     Krishna Mcneal, DO    Supplementary Documentation:     Quality:  DVT Prophylaxis: hep subcutaneous   CODE status:  FULL  Jean: no  Central line: no  If COVID testing is negative, may discontinue isolation: yes     Estimated date of discharge: likely tomorrow   Discharge is dependent on: clinical course   At this point Ms. Sears is expected to be discharge to: home    Plan of care discussed with pt

## 2024-03-18 VITALS
SYSTOLIC BLOOD PRESSURE: 116 MMHG | RESPIRATION RATE: 18 BRPM | OXYGEN SATURATION: 99 % | WEIGHT: 161 LBS | TEMPERATURE: 97 F | HEART RATE: 68 BPM | HEIGHT: 67 IN | DIASTOLIC BLOOD PRESSURE: 75 MMHG | BODY MASS INDEX: 25.27 KG/M2

## 2024-03-18 LAB
ANION GAP SERPL CALC-SCNC: 6 MMOL/L (ref 0–18)
BUN BLD-MCNC: 6 MG/DL (ref 9–23)
CALCIUM BLD-MCNC: 9.5 MG/DL (ref 8.5–10.1)
CHLORIDE SERPL-SCNC: 101 MMOL/L (ref 98–112)
CO2 SERPL-SCNC: 25 MMOL/L (ref 21–32)
CREAT BLD-MCNC: 0.62 MG/DL
EGFRCR SERPLBLD CKD-EPI 2021: 94 ML/MIN/1.73M2 (ref 60–?)
GLUCOSE BLD-MCNC: 85 MG/DL (ref 70–99)
GLUCOSE BLD-MCNC: 89 MG/DL (ref 70–99)
GLUCOSE BLD-MCNC: 96 MG/DL (ref 70–99)
MAGNESIUM SERPL-MCNC: 2 MG/DL (ref 1.6–2.6)
OSMOLALITY SERPL CALC.SUM OF ELEC: 271 MOSM/KG (ref 275–295)
POTASSIUM SERPL-SCNC: 4 MMOL/L (ref 3.5–5.1)
SODIUM SERPL-SCNC: 132 MMOL/L (ref 136–145)

## 2024-03-18 PROCEDURE — 83735 ASSAY OF MAGNESIUM: CPT | Performed by: INTERNAL MEDICINE

## 2024-03-18 PROCEDURE — 80048 BASIC METABOLIC PNL TOTAL CA: CPT | Performed by: INTERNAL MEDICINE

## 2024-03-18 PROCEDURE — 82962 GLUCOSE BLOOD TEST: CPT

## 2024-03-18 PROCEDURE — 97116 GAIT TRAINING THERAPY: CPT

## 2024-03-18 NOTE — PROGRESS NOTES
[facility]   part of [facility]    Nephrology Progress Note    Carmen Sears Attending:  Krishna Mcneal DO       SUBJECTIVE:     Diarrhea improving.  Still low oral intake  No leg swelling    PHYSICAL EXAM:     Vital Signs: /75 (BP Location: Left arm)   Pulse 68   Temp 97.2 °F (36.2 °C) (Oral)   Resp 18   Ht 170.2 cm (5' 7\")   Wt 161 lb (73 kg)   LMP  (LMP Unknown)   SpO2 99%   BMI 25.22 kg/m²   Temp (24hrs), Av.6 °F (36.4 °C), Min:97.2 °F (36.2 °C), Max:98 °F (36.7 °C)       Intake/Output Summary (Last 24 hours) at 3/18/2024 1305  Last data filed at 3/18/2024 0800  Gross per 24 hour   Intake 819.17 ml   Output --   Net 819.17 ml     Wt Readings from Last 3 Encounters:   24 161 lb (73 kg)   23 213 lb (96.6 kg)   22 230 lb (104.3 kg)       General: pleasant, well appearing  Skin: no visible rashes  HEENT: NCAT  Cardiac: Regular rate and rhythm, no murmur/gallop or rub  Lungs: CTAB, no wheeze, no rale, no rhonchi  Abdomen: Soft, NTND  Extremities: warm, well perfused, no leg edema  Neurologic/Psych: mentating well, no asterixis       LABORATORY DATA:     Lab Results   Component Value Date    GLU 89 2024    BUN 6 (L) 2024    BUNCREA 25.0 (H) 10/11/2021    CREATSERUM 0.62 2024    ANIONGAP 6 2024    GFRNAA 58 (L) 2018    GFRAA 67 2018    CA 9.5 2024    OSMOCALC 271 (L) 2024    ALKPHO 42 (L) 2024    AST 17 2024    ALT 23 2024    BILT 0.6 2024    TP 5.9 (L) 2024    ALB 3.5 2024    GLOBULIN 2.4 (L) 2024    AGRATIO 2.0 10/11/2016     (L) 2024    K 4.0 2024     2024    CO2 25.0 2024     Lab Results   Component Value Date    WBC 5.6 2024    RBC 4.67 2024    HGB 14.2 2024    HCT 39.7 2024    .0 (L) 2024    MCV 85.0 2024    MCH 30.4 2024    MCHC 35.8 2024    RDW 13.4 2024    NEPRELIM 5.76  03/15/2024    NEPERCENT 76.5 03/15/2024    LYPERCENT 15.0 03/15/2024    MOPERCENT 7.4 03/15/2024    EOPERCENT 0.3 03/15/2024    BAPERCENT 0.5 03/15/2024    NE 5.76 03/15/2024    LYMABS 1.13 03/15/2024    MOABSO 0.56 03/15/2024    EOABSO 0.02 03/15/2024    BAABSO 0.04 03/15/2024     Lab Results   Component Value Date    MALBP 4.92 08/18/2023    CREUR 27.60 08/18/2023    CREUR 27.60 08/18/2023     Lab Results   Component Value Date    COLORUR Colorless (A) 03/15/2024    CLARITY Clear 03/15/2024    SPECGRAVITY <1.005 (L) 03/15/2024    GLUUR Normal 03/15/2024    BILUR Negative 03/15/2024    KETUR Negative 03/15/2024    BLOODURINE Negative 03/15/2024    PHURINE 5.0 03/15/2024    PROUR Negative 03/15/2024    UROBILINOGEN Normal 03/15/2024    NITRITE Negative 03/15/2024    LEUUR 250 (A) 03/15/2024    WBCUR 11-20 (A) 03/15/2024    RBCUR 6-10 (A) 03/15/2024    EPIUR None Seen 03/15/2024    BACUR Rare (A) 03/15/2024    HYLUR Present (A) 03/15/2024         IMAGING:     Reviewed.    MEDICATIONS:       Current Facility-Administered Medications   Medication Dose Route Frequency    lisinopril (Zestril) tab 10 mg  10 mg Oral Daily    acetaminophen (Tylenol Extra Strength) tab 500 mg  500 mg Oral Q4H PRN    amLODIPine (Norvasc) tab 5 mg  5 mg Oral Daily    aspirin DR tab 81 mg  81 mg Oral Daily    levothyroxine (Synthroid) tab 88 mcg  88 mcg Oral Daily @ 0700    metoprolol tartrate (Lopressor) tab 50 mg  50 mg Oral BID    pantoprazole (Protonix) DR tab 40 mg  40 mg Oral QAM AC    rosuvastatin (Crestor) tab 20 mg  20 mg Oral Nightly    heparin (Porcine) 5000 UNIT/ML injection 5,000 Units  5,000 Units Subcutaneous 2 times per day       ASSESSMENT/PLAN:     74 yo F with history of HTN, IBS, DM2, hypothyroidism presented with weakness, dizziness, chronic diarrhea, hyponatremia. Na was 122 on outpatient labs on 3/15.    Hyponatremia: urine sodium 25, urine osm 155  -- s/p saline then D5W  -- hold lasix, farxiga  -- encourage oral intake;  increase protein in diet  -- avoid NSAIDs, thiazides    HTN:  -- continue lisinopril, amlodipine, metoprolol    Follow up with Dr. Willis as planned on Wednesday.  D/w family at bedside and RN.  Ok to discharge from nephrology standpoint.        Thank you for allowing me to participate in the care of this patient. Please do not hesitate to call with questions or concerns.        Amie Rojas MD  [facility]  [address] [place] [id number]    3/18/2024  1:05 PM     Ketoconazole Pregnancy And Lactation Text: This medication is Pregnancy Category C and it isn't know if it is safe during pregnancy. It is also excreted in breast milk and breast feeding isn't recommended.

## 2024-03-18 NOTE — PLAN OF CARE
Patient alert and oriented x4. VSS. Afebrile. No c/o pain at this time. See MAR. Dizziness improved per pt report. No BM. Pt ate saltine crackers this evening. Safety precautions continued. Call light within reach.     Problem: CARDIOVASCULAR - ADULT  Goal: Maintains optimal cardiac output and hemodynamic stability  Description: INTERVENTIONS:  - Monitor vital signs, rhythm, and trends  - Monitor for bleeding, hypotension and signs of decreased cardiac output  - Evaluate effectiveness of vasoactive medications to optimize hemodynamic stability  - Monitor arterial and/or venous puncture sites for bleeding and/or hematoma  - Assess quality of pulses, skin color and temperature  - Assess for signs of decreased coronary artery perfusion - ex. Angina  - Evaluate fluid balance, assess for edema, trend weights  Outcome: Progressing  Goal: Absence of cardiac arrhythmias or at baseline  Description: INTERVENTIONS:  - Continuous cardiac monitoring, monitor vital signs, obtain 12 lead EKG if indicated  - Evaluate effectiveness of antiarrhythmic and heart rate control medications as ordered  - Initiate emergency measures for life threatening arrhythmias  - Monitor electrolytes and administer replacement therapy as ordered  Outcome: Progressing     Problem: METABOLIC/FLUID AND ELECTROLYTES - ADULT  Goal: Glucose maintained within prescribed range  Description: INTERVENTIONS:  - Monitor Blood Glucose as ordered  - Assess for signs and symptoms of hyperglycemia and hypoglycemia  - Administer ordered medications to maintain glucose within target range  - Assess barriers to adequate nutritional intake and initiate nutrition consult as needed  - Instruct patient on self management of diabetes  Outcome: Progressing  Goal: Electrolytes maintained within normal limits  Description: INTERVENTIONS:  - Monitor labs and rhythm and assess patient for signs and symptoms of electrolyte imbalances  - Administer electrolyte replacement as  ordered  - Monitor response to electrolyte replacements, including rhythm and repeat lab results as appropriate  - Fluid restriction as ordered  - Instruct patient on fluid and nutrition restrictions as appropriate  Outcome: Progressing  Goal: Hemodynamic stability and optimal renal function maintained  Description: INTERVENTIONS:  - Monitor labs and assess for signs and symptoms of volume excess or deficit  - Monitor intake, output and patient weight  - Monitor urine specific gravity, serum osmolarity and serum sodium as indicated or ordered  - Monitor response to interventions for patient's volume status, including labs, urine output, blood pressure (other measures as available)  - Encourage oral intake as appropriate  - Instruct patient on fluid and nutrition restrictions as appropriate  Outcome: Progressing     Problem: GASTROINTESTINAL - ADULT  Goal: Maintains or returns to baseline bowel function  Description: INTERVENTIONS:  - Assess bowel function  - Maintain adequate hydration with IV or PO as ordered and tolerated  - Evaluate effectiveness of GI medications  - Encourage mobilization and activity  - Obtain nutritional consult as needed  - Establish a toileting routine/schedule  - Consider collaborating with pharmacy to review patient's medication profile  Outcome: Not Progressing  Goal: Maintains adequate nutritional intake (undernourished)  Description: INTERVENTIONS:  - Monitor percentage of each meal consumed  - Identify factors contributing to decreased intake, treat as appropriate  - Assist with meals as needed  - Monitor I&O, WT and lab values  - Obtain nutritional consult as needed  - Optimize oral hygiene and moisture  - Encourage food from home; allow for food preferences  - Enhance eating environment  Outcome: Not Progressing

## 2024-03-18 NOTE — OCCUPATIONAL THERAPY NOTE
Received order for OT evaluation. Per PT who worked with the patient this morning, patient is independent with all ADL tasks. No skilled OT needs. Will sign off.

## 2024-03-18 NOTE — DISCHARGE SUMMARY
[facility] Internal Medicine Hospitalist Discharge Summary     Patient ID:  Carmen Sears  73 year old  3/22/1950    Admit date: 3/15/2024    Discharge date and time: 3/18/2024    Attending Physician: Krishna Mcneal DO     Primary Care Physician: Joe Roblero MD (Ramesh)     Discharge Diagnoses:   Severe hyponatremia   HTN  HLD  DM II  Hypothyroidism   GERD      Please note that only IHP DMG and EMG patients enrolled in the Medicare ACO, [facility] ACO and [facility] HMOs will be handled by the [facility] Care Management team.  For all other patients, please follow usual protocol for discharge care transition.    Discharge Condition: stable    Disposition:  Home    Important Follow up:  - PCP - Dr. Padron 3/21/24 at 11:30 am  - Consults: Renal    Follow Up Items:  BMP per renal       Hospital Course:      73 yr old female with PMH sig for HTN, IBS, DM II, hypothyroidism, and peripheral neuropathy who presented to the ED for evaluation of weakness and dizziness.     # Severe hyponatremia   - likely hypovolemic   - s/p 1 L NS  - hold home diuretics   - monitor Na closely   - renal c/s appreciated      # Essential HTN  - hold lasix due to hyponatremia   - cont amlodipine, metoprolol  - resume lisinopril      # HLD  - cont statin     # IBS  - with chronic diarrhea   - monitored as outpt     # DM II with hyperglycemia   - hold po DM meds  - monitor sugars, no need for insulin      # Hypothyroidism   - cont levothyroxine      # GERD  - cont PPI     # Bacteruria with pyuria  - no clinical evidence of UTI  - monitor      Stable for discharge home.    Consults: IP CONSULT TO HOSPITALIST  IP CONSULT TO NEPHROLOGY    Operative Procedures:  None      Patient instructions:      I as the attending physician reconciled the current and discharge medications on day of discharge.        Medication List        CONTINUE taking these medications      amLODIPine 5 MG  Tabs  Commonly known as: Norvasc     aspirin 81 MG Tabs     FISH OIL OR     Glucose Blood Strp  Twice a day     levothyroxine 88 MCG Tabs  Commonly known as: Synthroid     lisinopril 10 MG Tabs  Commonly known as: Zestril     metFORMIN 500 MG Tabs  Commonly known as: Glucophage     metoprolol tartrate 50 MG Tabs  Commonly known as: Lopressor  TAKE 1 TABLET(50 MG) BY MOUTH TWICE DAILY     Mounjaro 7.5 MG/0.5ML Sopn  Generic drug: Tirzepatide     omeprazole 20 MG Cpdr  Commonly known as: PriLOSEC  TAKE 1 CAPSULE BY MOUTH DAILY     rosuvastatin 20 MG Tabs  Commonly known as: Crestor  Take 1 tablet (20 mg total) by mouth daily.            STOP taking these medications      Potassium Chloride ER 10 MEQ Tbcr  Commonly known as: K-DUR     vitamin C 1000 MG Tabs     Xigduo XR 5-1000 MG Tb24  Generic drug: Dapagliflozin Pro-metFORMIN ER            ASK your doctor about these medications      ALLEGRA OR     furosemide 20 MG Tabs  Commonly known as: Lasix     Loperamide HCl 1 MG/5ML Liqd  Commonly known as: IMODIUM                Activity: activity as tolerated  Diet: cardiac diet and diabetic diet  Wound Care: as directed  Code Status: Full Code      Exam on day of discharge:     Vitals:    03/18/24 0500   BP: 121/70   Pulse: 63   Resp: 18   Temp: 97.2 °F (36.2 °C)       Gen: No acute distress, alert and oriented x3, no focal neurologic deficits  HEENT:  EOMI, PERRLA, OP clear, MMM  Pulm: Lungs clear bilaterally, normal respiratory effort  CV: Heart with regular rate and rhythm, no murmur.  Normal PMI.    Abd: Abdomen soft, nontender, nondistended, no organomegaly, bowel sounds present  MSK: Full range of motion in extremities, no clubbing, no cyanosis  Skin: no rashes or lesions  Neuro:  Grossly intact, no focal deficits      Total time coordinating care for discharge: Greater than 30 minutes    Krishna Conklin [facility] Hospitalist

## 2024-03-18 NOTE — PHYSICAL THERAPY NOTE
PHYSICAL THERAPY TREATMENT NOTE - INPATIENT    Room Number: 402/402-A     Session: 1     Number of Visits to Meet Established Goals: 3    History related to current admission: Patient is a 73 year old female admitted on 3/15/2024 from Home for abnormal Blood results, weakness an dizziness.  Pt diagnosed with Hyponatremia.     Prior Level of [place]: Pt states that she lives in a house with her spouse. Pt reports that she is IND with all ADLs and does not use a assisted device for gait. Pt reports no history of falls.   Presenting Problem: Hyponatermia  Co-Morbidities : HTN, DM, IBS with diarrhea, hypothyroidism    ASSESSMENT   Patient demonstrates good  progress this session, goals  addressed appropriately    Patient continues to function at baseline with gait.  Contributing factors to remaining limitations include impaired dynamic balance. Encouraged use of assistive device for ambulation and continued work with OPPT for dynamic balance deficits demonstrated.      Patient has met all skilled IPPT goals at this time. Patient will be discharged from Physical Therapy services.  Please re-order if a new functional limitation presents during this admission.      PLAN  PT Treatment Plan: Gait training;Patient education;Strengthening;Transfer training;Balance training  Rehab Potential : Good  Frequency (Obs): 3-5x/week    CURRENT GOALS     Goal #1 Patient is able to ambulate 150 feet with assist device: Least Restrictive Device at assistance level: modified independent      Goal #2        Goal #3        Goal #4     Goal #5     Goal #6     Goal Comments: Goals established on 3/16/2024  3/18/2024 all goals adequate achievement     SUBJECTIVE  \" Are you sure I have to do this?\"     OBJECTIVE  Precautions: None    WEIGHT BEARING RESTRICTION  Weight Bearing Restriction: None                PAIN ASSESSMENT   Ratin  Location: Pt reported no pain       BALANCE                                                                                                                        Static Sitting: Good  Dynamic Sitting: Good           Static Standing: Good  Dynamic Standing: Good    ACTIVITY TOLERANCE                         O2 WALK         AM-PAC '6-Clicks' INPATIENT SHORT FORM - BASIC MOBILITY  How much difficulty does the patient currently have...  Patient Difficulty: Turning over in bed (including adjusting bedclothes, sheets and blankets)?: None   Patient Difficulty: Sitting down on and standing up from a chair with arms (e.g., wheelchair, bedside commode, etc.): None   Patient Difficulty: Moving from lying on back to sitting on the side of the bed?: None   How much help from another person does the patient currently need...   Help from Another: Moving to and from a bed to a chair (including a wheelchair)?: None   Help from Another: Need to walk in hospital room?: None   Help from Another: Climbing 3-5 steps with a railing?: A Little       AM-PAC Score:  Raw Score: 23   Approx Degree of Impairment: 11.2%   Standardized Score (AM-PAC Scale): 56.93   CMS Modifier (G-Code): CI    FUNCTIONAL ABILITY STATUS  Gait Assessment   Functional Mobility/Gait Assessment  Gait Assistance: Contact guard assist  Distance (ft): 450  Assistive Device: None  Pattern:  (inc lateral sway)    Skilled Therapy Provided    Bed Mobility:  Rolling: indep   Supine<>Sit: indep   Sit<>Supine: indep     Transfer Mobility:  Sit<>Stand: indep   Stand<>Sit: indep   Gait: SBA intermittent CGA 2/2 inc lateral sway, improved with gait training more consistently able to perform SBA With cuing for wider GAVIN (pt tends to hit her swing phase toe against stance phase heel), allowing natural arm swing and trunk rotation.     Modified Covarrubias Balance Test    1. Sitting to standing                                                                   4  2. Standing unsupported with eyes closed                                4  3. Reaching forward with outstretched arm while standing        4  4.  object from the floor from a standing position           4  5. Turning to look over left and right shoulders while standing   4  6. Standing unsupported one foot in front                                   2  7. Standing on one leg                                                                2    Pt scored 24/28 which shows no increased risk for falls.          Patient End of Session: In bed;Needs met;Call light within reach;RN aware of session/findings;All patient questions and concerns addressed    PT Session Time: 10 minutes  Gait Training: 10 minutes  Therapeutic Activity:  minutes  Therapeutic Exercise:  minutes   Neuromuscular Re-education:  minutes

## 2024-03-18 NOTE — PLAN OF CARE
Received pt alert and orientated x4. VSS. No sob on RA. Afebrile. No c/o pain. Tolerating diet. Up and voiding. Walked in the halls with PT. All meds given per MAR. Fall precautions in place. Call light within reach.     1400: Pt cleared for DC. IV removed. DC paperwork provided, pt verbalized understanding. All pt belongings gathered and sent with the pt.     NURSING DISCHARGE NOTE    Discharged Home via Wheelchair.  Accompanied by RN  Belongings Taken by patient/family.      Problem: CARDIOVASCULAR - ADULT  Goal: Maintains optimal cardiac output and hemodynamic stability  Description: INTERVENTIONS:  - Monitor vital signs, rhythm, and trends  - Monitor for bleeding, hypotension and signs of decreased cardiac output  - Evaluate effectiveness of vasoactive medications to optimize hemodynamic stability  - Monitor arterial and/or venous puncture sites for bleeding and/or hematoma  - Assess quality of pulses, skin color and temperature  - Assess for signs of decreased coronary artery perfusion - ex. Angina  - Evaluate fluid balance, assess for edema, trend weights  Outcome: Progressing  Goal: Absence of cardiac arrhythmias or at baseline  Description: INTERVENTIONS:  - Continuous cardiac monitoring, monitor vital signs, obtain 12 lead EKG if indicated  - Evaluate effectiveness of antiarrhythmic and heart rate control medications as ordered  - Initiate emergency measures for life threatening arrhythmias  - Monitor electrolytes and administer replacement therapy as ordered  Outcome: Progressing     Problem: GASTROINTESTINAL - ADULT  Goal: Maintains or returns to baseline bowel function  Description: INTERVENTIONS:  - Assess bowel function  - Maintain adequate hydration with IV or PO as ordered and tolerated  - Evaluate effectiveness of GI medications  - Encourage mobilization and activity  - Obtain nutritional consult as needed  - Establish a toileting routine/schedule  - Consider collaborating with pharmacy to review  patient's medication profile  Outcome: Progressing  Goal: Maintains adequate nutritional intake (undernourished)  Description: INTERVENTIONS:  - Monitor percentage of each meal consumed  - Identify factors contributing to decreased intake, treat as appropriate  - Assist with meals as needed  - Monitor I&O, WT and lab values  - Obtain nutritional consult as needed  - Optimize oral hygiene and moisture  - Encourage food from home; allow for food preferences  - Enhance eating environment  Outcome: Progressing     Problem: METABOLIC/FLUID AND ELECTROLYTES - ADULT  Goal: Glucose maintained within prescribed range  Description: INTERVENTIONS:  - Monitor Blood Glucose as ordered  - Assess for signs and symptoms of hyperglycemia and hypoglycemia  - Administer ordered medications to maintain glucose within target range  - Assess barriers to adequate nutritional intake and initiate nutrition consult as needed  - Instruct patient on self management of diabetes  Outcome: Progressing  Goal: Electrolytes maintained within normal limits  Description: INTERVENTIONS:  - Monitor labs and rhythm and assess patient for signs and symptoms of electrolyte imbalances  - Administer electrolyte replacement as ordered  - Monitor response to electrolyte replacements, including rhythm and repeat lab results as appropriate  - Fluid restriction as ordered  - Instruct patient on fluid and nutrition restrictions as appropriate  Outcome: Progressing  Goal: Hemodynamic stability and optimal renal function maintained  Description: INTERVENTIONS:  - Monitor labs and assess for signs and symptoms of volume excess or deficit  - Monitor intake, output and patient weight  - Monitor urine specific gravity, serum osmolarity and serum sodium as indicated or ordered  - Monitor response to interventions for patient's volume status, including labs, urine output, blood pressure (other measures as available)  - Encourage oral intake as appropriate  - Instruct  patient on fluid and nutrition restrictions as appropriate  Outcome: Progressing

## 2024-03-19 NOTE — PAYOR COMM NOTE
--------------  DISCHARGE REVIEW    Payor: [employer]  Subscriber #:  [id number]  Authorization Number: [id number]    Admit date: 3/15/24  Admit time:   7:58 PM  Discharge Date: 3/18/2024  2:26 PM     Admitting Physician: Lokesh Michelle DO  Attending Physician:  No att. providers found  Primary Care Physician: Joe Roblero MD          Discharge Summary Notes        Discharge Summary signed by Krishna Mcneal DO at 3/18/2024  9:56 AM       Author: Krishna Mcneal DO Specialty: Internal Medicine Author Type: Physician    Filed: 3/18/2024  9:56 AM Date of Service: 3/18/2024  9:42 AM Status: Addendum    : Krishna Mcneal DO (Physician)    Related Notes: Original Note by Krishna Mcneal DO (Physician) filed at 3/18/2024  9:45 AM                                                          [facility] Internal Medicine Hospitalist Discharge Summary     Patient ID:  Carmen Sears  73 year old  3/22/1950    Admit date: 3/15/2024    Discharge date and time: 3/18/2024    Attending Physician: Krishna Mcneal DO     Primary Care Physician: Joe Roblero MD (Ramesh)     Discharge Diagnoses:   Severe hyponatremia   HTN  HLD  DM II  Hypothyroidism   GERD      Please note that only IHP DMG and EMG patients enrolled in the Medicare ACO, [facility] ACO and [facility] HMOs will be handled by the [facility] Care Management team.  For all other patients, please follow usual protocol for discharge care transition.    Discharge Condition: stable    Disposition:  Home    Important Follow up:  - PCP - Dr. Padron 3/21/24 at 11:30 am  - Consults: Renal    Follow Up Items:  BMP per renal       Hospital Course:      73 yr old female with PMH sig for HTN, IBS, DM II, hypothyroidism, and peripheral neuropathy who presented to the ED for evaluation of weakness and dizziness.     # Severe hyponatremia   - likely hypovolemic   - s/p 1 L NS  - hold home diuretics   - monitor Na closely   - renal c/s  appreciated      # Essential HTN  - hold lasix due to hyponatremia   - cont amlodipine, metoprolol  - resume lisinopril      # HLD  - cont statin     # IBS  - with chronic diarrhea   - monitored as outpt     # DM II with hyperglycemia   - hold po DM meds  - monitor sugars, no need for insulin      # Hypothyroidism   - cont levothyroxine      # GERD  - cont PPI     # Bacteruria with pyuria  - no clinical evidence of UTI  - monitor      Stable for discharge home.    Consults: IP CONSULT TO HOSPITALIST  IP CONSULT TO NEPHROLOGY    Operative Procedures:  None      Patient instructions:      I as the attending physician reconciled the current and discharge medications on day of discharge.        Medication List        CONTINUE taking these medications      amLODIPine 5 MG Tabs  Commonly known as: Norvasc     aspirin 81 MG Tabs     FISH OIL OR     Glucose Blood Strp  Twice a day     levothyroxine 88 MCG Tabs  Commonly known as: Synthroid     lisinopril 10 MG Tabs  Commonly known as: Zestril     metFORMIN 500 MG Tabs  Commonly known as: Glucophage     metoprolol tartrate 50 MG Tabs  Commonly known as: Lopressor  TAKE 1 TABLET(50 MG) BY MOUTH TWICE DAILY     Mounjaro 7.5 MG/0.5ML Sopn  Generic drug: Tirzepatide     omeprazole 20 MG Cpdr  Commonly known as: PriLOSEC  TAKE 1 CAPSULE BY MOUTH DAILY     rosuvastatin 20 MG Tabs  Commonly known as: Crestor  Take 1 tablet (20 mg total) by mouth daily.            STOP taking these medications      Potassium Chloride ER 10 MEQ Tbcr  Commonly known as: K-DUR     vitamin C 1000 MG Tabs     Xigduo XR 5-1000 MG Tb24  Generic drug: Dapagliflozin Pro-metFORMIN ER            ASK your doctor about these medications      ALLEGRA OR     furosemide 20 MG Tabs  Commonly known as: Lasix     Loperamide HCl 1 MG/5ML Liqd  Commonly known as: IMODIUM                Activity: activity as tolerated  Diet: cardiac diet and diabetic diet  Wound Care: as directed  Code Status: Full Code      Exam on day  of discharge:     Vitals:    03/18/24 0500   BP: 121/70   Pulse: 63   Resp: 18   Temp: 97.2 °F (36.2 °C)       Gen: No acute distress, alert and oriented x3, no focal neurologic deficits  HEENT:  EOMI, PERRLA, OP clear, MMM  Pulm: Lungs clear bilaterally, normal respiratory effort  CV: Heart with regular rate and rhythm, no murmur.  Normal PMI.    Abd: Abdomen soft, nontender, nondistended, no organomegaly, bowel sounds present  MSK: Full range of motion in extremities, no clubbing, no cyanosis  Skin: no rashes or lesions  Neuro:  Grossly intact, no focal deficits      Total time coordinating care for discharge: Greater than 30 minutes    Krishna Mcneal DO  [facility] Hospitalist       Electronically signed by Krishna Mcneal DO on 3/18/2024  9:56 AM         REVIEWER COMMENTS

## 2024-03-19 NOTE — PAYOR COMM NOTE
--------------  ADMISSION REVIEW     Payor: [employer]  Subscriber #:  [id number]  Authorization Number: [id number]    Admit date: 3/15/24  Admit time:  7:58 PM       REVIEW DOCUMENTATION:     ED Provider Notes        ED Provider Notes signed by Saji Pereyra MD at 3/16/2024  1:35 AM       Author: Saji Pereyra MD Service: -- Author Type: Physician    Filed: 3/16/2024  1:35 AM Date of Service: 3/15/2024  3:56 PM Status: Addendum    : Saji Pereyra MD (Physician)    Related Notes: Original Note by Saji Pereyra MD (Physician) filed at 3/16/2024  1:34 AM           Patient Seen in: [facility] Emergency Department      History     Chief Complaint   Patient presents with    Abnormal Result     Stated Complaint: abnormal labs, low NA    Subjective:     HPI    73-year-old woman with history of non-insulin-dependent diabetes and hypertension to the emergency department for hyponatremia.  Patient is on furosemide and Mounjaro.  She has not been eating or drinking.  Feels very weak.  She has had some loose stools and nausea.  No vomiting.  No abdominal pain.  Her sodium level was found to be low 2 days ago and getting lower.  She was sent to the emergency department emergency department by nephrology.      Objective:   Past Medical History:   Diagnosis Date    Atherosclerosis of coronary artery     Back problem     Cancer (HCC)     Cataract     Coronary atherosclerosis     Diabetes (HCC)     Disorder of thyroid     High blood pressure     High cholesterol     IBS (irritable bowel syndrome)     Neuropathy     Osteoarthritis     Renal disorder     Sleep apnea               Past Surgical History:   Procedure Laterality Date    ANGIOGRAM  2001, 2005 June 2001, Sept 2005, moderate disease LAD D bifurcation    APPENDECTOMY      CHOLECYSTECTOMY      HYSTEROSCOPY      KNEE SURGERY      OTHER SURGICAL HISTORY      rectal tumor (w/RT and chemo),     REPAIR ROTATOR CUFF,ACUTE      TONSILLECTOMY       TREAT ECTOPIC PREG,ABD PREG                  Social History     Socioeconomic History    Marital status:    Tobacco Use    Smoking status: Former     Packs/day: 1.00     Years: 30.00     Additional pack years: 0.00     Total pack years: 30.00     Types: Cigarettes     Start date:      Quit date: 2000     Years since quittin.6    Smokeless tobacco: Never   Vaping Use    Vaping Use: Never used   Substance and Sexual Activity    Alcohol use: No     Alcohol/week: 0.0 standard drinks of alcohol    Drug use: No     Social Determinants of Health     Food Insecurity: No Food Insecurity (3/15/2024)    Food Insecurity     Food Insecurity: Never true   Transportation Needs: No Transportation Needs (3/15/2024)    Transportation Needs     Lack of Transportation: No   Housing Stability: Low Risk  (3/15/2024)    Housing Stability     Housing Instability: No              Review of Systems    Positive for stated complaint: abnormal labs, low NA  Other systems are as noted in HPI.  Constitutional and vital signs reviewed.      All other systems reviewed and negative except as noted above.    Physical Exam     ED Triage Vitals [03/15/24 1350]   /80   Pulse 86   Resp 18   Temp 97.4 °F (36.3 °C)   Temp src Temporal   SpO2 97 %   O2 Device None (Room air)       Current:/65 (BP Location: Left arm)   Pulse 83   Temp 98 °F (36.7 °C) (Oral)   Resp 18   Ht 170.2 cm (5' 7\")   Wt 71.4 kg   LMP  (LMP Unknown)   SpO2 99%   BMI 24.67 kg/m²       General:  Vitals as listed.  No acute distress   HEENT: Sclerae anicteric.  Conjunctivae show no pallor.  Oropharynx clear, mucous membranes dry  Lungs: good air exchange and clear   Heart: regular rate rhythm and no murmur   Abdomen: Soft and nontender.  No abdominal masses.  No peritoneal signs   Extremities: no edema, normal peripheral pulses   Neuro: Alert oriented and nonfocal    ED Course     Labs Reviewed   COMP METABOLIC PANEL (14) - Abnormal; Notable for  the following components:       Result Value    Glucose 109 (*)     Sodium 122 (*)     Potassium 3.2 (*)     Chloride 82 (*)     Calculated Osmolality 255 (*)     Alkaline Phosphatase 53 (*)     All other components within normal limits   URINALYSIS, ROUTINE - Abnormal; Notable for the following components:    Urine Color Colorless (*)     Spec Gravity <1.005 (*)     Leukocyte Esterase Urine 250 (*)     WBC Urine 11-20 (*)     RBC Urine 6-10 (*)     Bacteria Urine Rare (*)     Hyaline Casts Present (*)     All other components within normal limits   BASIC METABOLIC PANEL (8) - Abnormal; Notable for the following components:    Sodium 124 (*)     Chloride 87 (*)     Calculated Osmolality 257 (*)     All other components within normal limits   OSMOLALITY, URINE - Abnormal; Notable for the following components:    Osmolality Urine 155 (*)     All other components within normal limits   POCT GLUCOSE - Normal   SARS-COV-2/FLU A AND B/RSV BY PCR (GENEXPERT) - Normal    Narrative:     This test is intended for the qualitative detection and differentiation of SARS-CoV-2, influenza A, influenza B, and respiratory syncytial virus (RSV) viral RNA in nasopharyngeal or nares swabs from individuals suspected of respiratory viral infection consistent with COVID-19 by their healthcare provider. Signs and symptoms of respiratory viral infection due to SARS-CoV-2, influenza, and RSV can be similar.    Test performed using the Xpert Xpress SARS-CoV-2/FLU/RSV (real time RT-PCR)  assay on the GeneXpert instrument, [employer], [place] [address].   This test is being used under the Food and Drug Administration's Emergency Use Authorization.    The authorized Fact Sheet for Healthcare Providers for this assay is available upon request from the laboratory.   CBC WITH DIFFERENTIAL WITH PLATELET    Narrative:     The following orders were created for panel order CBC With Differential With Platelet.  Procedure                                Abnormality         Status                     ---------                               -----------         ------                     CBC W/ DIFFERENTIAL[[id number]]                              Final result                 Please view results for these tests on the individual orders.   SODIUM, URINE, RANDOM   POTASSIUM, URINE, RANDOM   TSH W REFLEX TO FREE T4   CORTISOL   MAGNESIUM   COMP METABOLIC PANEL (14)   CBC, PLATELET; NO DIFFERENTIAL   GI STOOL PANEL BY PCR   C. DIFFICILE(TOXIGENIC)PCR   CBC W/ DIFFERENTIAL       ED COURSE and MDM       I reviewed prior external notes including sodium that was done 3/15/2024 at 9 AM, which was  122    Hydrated with normal saline    [facility] hospitalist notified    I have discussed with the patient the results of testing, differential diagnosis, and treatment plan. They expressed clear understanding of these instructions and agrees to the plan provided.    Disposition and Plan     Clinical Impression:  1. Hyponatremia    2. Weakness generalized           Signed by Saji Pereyra MD on 3/16/2024  1:35 AM          Date/Time Temp Pulse Resp BP SpO2 Weight O2 Device O2 Flow Rate (L/min) [facility]    03/18/24 1142 97.2 °F (36.2 °C) 68 18 116/75 99 % -- None (Room air) -- KM    03/18/24 0830 97.6 °F (36.4 °C) 77 16 111/70 99 % -- None (Room air) -- AF    03/18/24 0711 -- -- -- -- -- 161 lb -- -- AF    03/18/24 0500 97.2 °F (36.2 °C) 63 18 121/70 98 % 161 lb 3.2 oz None (Room air) -- RF    03/17/24 2112 98 °F (36.7 °C) 68 16 110/64 98 % -- None (Room air) -- RF    03/17/24 1725 97.9 °F (36.6 °C) 66 15 101/65 100 % -- None (Room air) -- AC    03/17/24 1215 97.8 °F (36.6 °C) 72 15 114/65 100 % -- None (Room air) -- DK    03/17/24 0757 97.9 °F (36.6 °C) 74 17 123/68 99 % -- None (Room air) -- DK    03/17/24 0500 98.3 °F (36.8 °C) 67 16 100/58 100 % -- -- -- KF           3/16 H&P    History of Present Illness: Patient is a 73 year old female with PMH sig for HTN, IBS, DM II, hypothyroidism,  and peripheral neuropathy who presented to the ED for evaluation of weakness and dizziness.  She was found on 3/13 to have a sodium low at 129.  Pt has not been eating drinking well, has had loose stools and nausea.  Over she feels very weak.  No abd pain but she has chronic diarrhea, has occasional vomiting.   She was directed to the ED by renal.  She lives at home independently with her .      In the ED, Na low at 122.   UA with LE, WBC, RBC, and bacteria.  1 L NS was given.     On my evaluation, pt states she is feeling better, less lightheaded.       Assessment/Plan:      73 yr old female with PMH sig for HTN, IBS, DM II, hypothyroidism, and peripheral neuropathy who presented to the ED for evaluation of weakness and dizziness.     # Severe hyponatremia   - likely hypovolemic   - s/p 1 L NS  - hold home diuretics   - monitor Na closely   - renal c/s appreciated      # Essential HTN  - hold lasix due to hyponatremia   - cont amlodipine, metoprolol  - resume lisinopril in AM     # HLD  - cont statin     # IBS  - with chronic diarrhea   - monitored as outpt     # DM II with hyperglycemia   - hold po DM meds  - monitor sugars, no need for insulin      # Hypothyroidism   - cont levothyroxine      # GERD  - cont PPI     # Bacteruria with pyuria  - no clinical evidence of UTI  - monitor      DVT prophy - hep subcutaneous   Dispo: inpt care.  POC d/w pt who agrees.      3/15 NEPHROLOGY CONSULT NOTE  Date of consultation: 3/15/2024      HISTORY OF PRESENT ILLNESS:      Carmen Sears is a 73 year old female with a medical history notable for hypertension, diabetes, IBS with diarrhea, who presents for abnormal labs. Nephrology consulted for hyponatremia.      Patient noted to have poor PO intake over the past 4 days. Not been eating or drinking anything due to concerns of IBS with diarrhea. Admits to feeling weak. Admits to diarrhea. Denies any NSAID use. Has been taking her furosemide daily along with all of  other medications. Outpatient labs showed low sodium over the past couple of days. On admission, noted to have sodium of 122 and potassium 3.2. Admitted for further workup and management.        ASSESSMENT:      # Severe Hyponatremia  -Likely hypovolemic     # Hypokalemia     PLAN:      S/p 1L bolus in ED     -Will obtain urinalysis, urine lytes  -Repeat BMP at 2000  -Goal sodium in next 24 hours should be 128-130 mEq/L  -Please page nephrology if sodium <122 or >130  -Will check TSH and cortisol levels  -Hold home diuretics  -Replace potassium  -Avoid nephrotoxins and renally dose medications for creatinine clearance  -Monitor intake and output daily  -Daily weights    3/16 NEPHROLOGY NOTE    SUBJECTIVE:      No acute events  SNa trend up      PHYSICAL EXAM:      Vital Signs: /84 (BP Location: Left arm)   Pulse 80   Temp 98.7 °F (37.1 °C) (Oral)   Resp 17   Ht 5' 7\" (1.702 m)   Wt 157 lb 8 oz (71.4 kg)   LMP  (LMP Unknown)   SpO2 99%   BMI 24.67 kg/m²   Temp (24hrs), Av.3 °F (36.8 °C), Min:97.4 °F (36.3 °C), Max:98.9 °F (37.2 °C)        Intake/Output Summary (Last 24 hours) at 3/16/2024 1336  Last data filed at 3/16/2024 1255      Gross per 24 hour   Intake 1042 ml   Output 0 ml   Net 1042 ml   LABS:         Lab Results   Component Value Date     WBC 5.6 2024     HGB 14.2 2024     HCT 39.7 2024     .0 2024     CREATSERUM 0.75 03/15/2024     BUN 13 03/15/2024      03/15/2024     K 3.6 03/15/2024     CL 87 03/15/2024     CO2 28.0 03/15/2024     GLU 87 03/15/2024     CA 9.6 03/15/2024     ALB 4.2 03/15/2024     ALKPHO 53 03/15/2024     BILT 0.9 03/15/2024     TP 7.1 03/15/2024     AST 22 03/15/2024     ALT 30 03/15/2024     PGLU 80 2024          ASSESSMENT:      # Severe Hyponatremia  -Likely hypovolemic  -TSH, cortisol OK      # Hypokalemia        PLAN:      -Risk for overcorrection.  Start D5W.  Repeat evening SNa level, page nephrology with results.    -Hold home diuretics  -Replace potassium per protocol      3/17 NEPHROLOGY NOTE       SUBJECTIVE:      No acute events  SNa better  No diarrhea     PHYSICAL EXAM:      Vital Signs: /58 (BP Location: Left arm)   Pulse 67   Temp 98.3 °F (36.8 °C) (Oral)   Resp 16   Ht 5' 7\" (1.702 m)   Wt 157 lb 8 oz (71.4 kg)   LMP  (LMP Unknown)   SpO2 100%   BMI 24.67 kg/m²   Temp (24hrs), Av.3 °F (36.8 °C), Min:97.9 °F (36.6 °C), Max:98.7 °F (37.1 °C)        Intake/Output Summary (Last 24 hours) at 3/17/2024 0855  Last data filed at 3/16/2024 1533      Gross per 24 hour   Intake 240 ml   Output 0 ml   Net 240 ml               Recent Labs   Lab 03/15/24  1402 03/15/24  2016 03/16/24  0602 24  1755 24  0520   * 87 88  --  82   BUN 14 13 10  --  6*   CREATSERUM 0.87 0.75 0.54*  --  0.60   CA 10.1 9.6 9.5  --  9.5   ALB 4.2  --  3.5  --   --    * 124* 130* 125* 129*   K 3.2* 3.6 3.1*  --  4.0   CL 82* 87* 94*  --  96*   CO2 29.0 28.0 26.0  --  27.0   ALKPHO 53*  --  42*  --   --    AST 22  --  17  --   --    ALT 30  --  23  --   --    BILT 0.9  --  0.6  --   --    TP 7.1  --  5.9*  --   --         Medications:    magnesium sulfate  4 g Intravenous Once    amLODIPine  5 mg Oral Daily    aspirin  81 mg Oral Daily    levothyroxine  88 mcg Oral Daily @ 0700    metoprolol tartrate  50 mg Oral BID    pantoprazole  40 mg Oral QAM AC    rosuvastatin  20 mg Oral Nightly    heparin  5,000 Units Subcutaneous 2 times per day               Assessment & Plan:   73 yr old female with PMH sig for HTN, IBS, DM II, hypothyroidism, and peripheral neuropathy who presented to the ED for evaluation of weakness and dizziness.     # Severe hyponatremia   - likely hypovolemic   - s/p 1 L NS  - hold home diuretics   - monitor Na closely   - renal c/s appreciated      # Essential HTN  - hold lasix due to hyponatremia   - cont amlodipine, metoprolol  - resume lisinopril      # HLD  - cont statin     # IBS  - with  chronic diarrhea   - monitored as outpt     # DM II with hyperglycemia   - hold po DM meds  - monitor sugars, no need for insulin      # Hypothyroidism   - cont levothyroxine      # GERD  - cont PPI     # Bacteruria with pyuria  - no clinical evidence of UTI  - monitor      Plan of care: inpt care.     3/18 NEPHROLOGY NOTE    SUBJECTIVE:      Diarrhea improving.  Still low oral intake  No leg swelling     PHYSICAL EXAM:      Vital Signs: /75 (BP Location: Left arm)   Pulse 68   Temp 97.2 °F (36.2 °C) (Oral)   Resp 18   Ht 170.2 cm (5' 7\")   Wt 161 lb (73 kg)   LMP  (LMP Unknown)   SpO2 99%   BMI 25.22 kg/m²   Temp (24hrs), Av.6 °F (36.4 °C), Min:97.2 °F (36.2 °C), Max:98 °F (36.7 °C)        Intake/Output Summary (Last 24 hours) at 3/18/2024 1305  Last data filed at 3/18/2024 0800      Gross per 24 hour   Intake 819.17 ml   Output --   Net 819.17 ml       (L) 2024      K 4.0 2024      2024     CO2 25.0 2024       ASSESSMENT/PLAN:      74 yo F with history of HTN, IBS, DM2, hypothyroidism presented with weakness, dizziness, chronic diarrhea, hyponatremia. Na was 122 on outpatient labs on 3/15.     Hyponatremia: urine sodium 25, urine osm 155  -- s/p saline then D5W  -- hold lasix, farxiga  -- encourage oral intake; increase protein in diet  -- avoid NSAIDs, thiazides     HTN:  -- continue lisinopril, amlodipine, metoprolol     Follow up with Dr. Willis as planned on Wednesday.  D/w family at bedside and RN.  Ok to discharge from nephrology standpoint.

## 2024-04-04 ENCOUNTER — TELEPHONE (OUTPATIENT)
Dept: PHYSICAL THERAPY | Facility: HOSPITAL | Age: 74
End: 2024-04-04

## 2024-04-08 ENCOUNTER — OFFICE VISIT (OUTPATIENT)
Dept: PHYSICAL THERAPY | Age: 74
End: 2024-04-08
Attending: INTERNAL MEDICINE
Payer: MEDICARE

## 2024-04-08 DIAGNOSIS — R53.1 WEAKNESS GENERALIZED: Primary | ICD-10-CM

## 2024-04-08 PROCEDURE — 97530 THERAPEUTIC ACTIVITIES: CPT

## 2024-04-08 PROCEDURE — 97161 PT EVAL LOW COMPLEX 20 MIN: CPT

## 2024-04-08 NOTE — PROGRESS NOTES
.   LOWER EXTREMITY EVALUATION:     Diagnosis:   Weakness generalized (R53.1)       Referring Provider: Krishna Mcneal  Date of Evaluation:    4/8/2024    Precautions:  Fall Risk Next MD visit:   none scheduled  Date of Surgery: n/a     PATIENT SUMMARY   Carmen Sears is a 74 year old female who presents to therapy today with complaints of Leg weakness  for several weeks but has gotten worse and was admitted to the hospital due to hyponatremia.The patient mentioned she has lost weight  for 70 lbs for the last year intentionally doing intermittent fasting.She has been under the care of her physician monitoring her labs and she wants to work on strengthening in therapy as she is limited with her ability to walk outdoors for distances.      Pt denies any pain but has thigh discomfort and soreness when walking for distances     Current functional limitations include decreased tolerance to distance ambulation.     Carmen describes prior level of function . Pt goals include to be stronger and try to be active again  .  Past medical history was reviewed with Carmen. Significant findings include :  Hypertensive heart disease with congestive heart failure (HCC),Postablative hypothyroidism,Anxiety  Obstructive sleep apnea syndrome, Type 2 diabetes mellitus with microalbuminuria, without long-term current use of insulin ([facility])  Mixed hyperlipidemia,Iron deficiency anemia,Vitamin B12 deficiency,History of anal cancer,Folic acid deficiency  Irritable bowel syndrome with diarrhea,Diabetic peripheral neuropathy (HCC)  Non-healing non-surgical wound, Fracture of left shoulder girdle, part unspecified, subsequent encounter for fracture with delayed healing  Acute pain of left shoulder,Decreased range of motion of left shoulde,Radiation skin ulcer (HCC)  CHF (NYHA class I, ACC/AHA stage B) ([facility]).Atherosclerosis of aorta (HCC)     ASSESSMENT  Carmen presents to physical therapy evaluation with primary c/o lower extremity  weakness and difficulty walking for increased distances . The results of the objective tests and measures show LE weakness and decreased flexibility of hip and knee muscles, gait deviation and difficulty with longer distances.  Functional deficits include but are not limited . Pt and PT discussed evaluation findings, pathology, POC and HEP.  Pt voiced understanding and performs HEP correctly without reported pain. Skilled Physical Therapy is medically necessary to address the above impairments and reach functional goals.     OBJECTIVE:   Observation: difficulty with sit to stand , ambulates with narrow GAVIN almost tandem walking  Palpation: (-) TPP on BLE  Sensation: WNL on BLE    AROM: (* denotes performed with pain)  WFL on BLE  Flexibility:  Hip Flexor: R Mod tightness, L Mod tightness  Hamstrings: R mod; L min tightness  Piriformis: R min mod; L mod  Quads: R Mod tightness ; L Mod tightness  Gastroc-soleus: R mod tightness ; L mod tightness    Strength/MMT: (* denotes performed with pain)  Hip Knee Foot/Ankle   Flexion: R 4/5; L 4/5  Extension: R 3-/5; L 3-/5  Abduction: R 3+/5; L 3+/5  ER: R 3+/5; L 3+/5  IR: R 3+/5; L #+/5 Flexion: R 4/5; L 4/5  Extension: R 4+/5; L 4+/5    DF: R 5/5; L 5/5  PF: R 5/5; L 4/5  INV: R 5/5; L 4/5  EV: R 5/5; L 4/5         Gait: pt ambulates on level ground without A.D. normal step length with slower conner but narrow base of support almost crossing or tandem walk occasionally.  6 min walk test 616 ft with episode of almost losing balance, she is also feeling getting fatigued and leg discomfort around 3 mins of walking but tried to complete the 6 mins test    Balance: SLS: R 8 sec, L 9 sec  5sit to stand 30 sec   SLS R 8 sec , L 9 sec   Squatting : difficulty to go downe wiht bilateral  hip internally rotated and having a harder time to get up .    LEFS Score  LEFS Score: 37.5 % (4/2/2024  9:09 AM)      Bandar’s Treatment and Response:   Pt education was provided on exam findings,  treatment diagnosis, treatment plan, expectations, and prognosis. Pt was also provided recommendations for activity modifications, possible soreness after evaluation, postural corrections, pain science education , detrimental fear avoidance behaviors, importance of remaining active, and strategies to reduce fall risk at home.  Patient was instructed in and issued a HEP for: repeated sit to stand, hip stretches such as mod romain stretch and quad stretch    Charges: PT Eval High Complexity, Thera Act      Total Timed Treatment: 8 min     Total Treatment Time: 45 min     Based on clinical rationale and outcome measures, this evaluation involved High Complexity decision making due to 3+ personal factors/comorbidities, 4+ body structures involved/activity limitations, and evolving symptoms including  improved ability to walk outdoors .  PLAN OF CARE:    Goals: (to be met in 8 visits)  Pt will improve her ability to get up from chair with 5 sit to stand to <20 sec to be able to perform  car transfers or toilet transfer without difficulty   Pt will improve hip ABD and ER strength to >4/5 to increase ease with standing and walking   Pt will be able to squat to  light objects around the house and or getting up from floor without difficulty.  Pt will improve functional hip strength to report ability to ascend/descend 1 flight of stairs reciprocally without or at least 1 hand support on handrail   Pt will demonstrate improved SLS to >10 seconds DELANEY to promote safety and decrease risk of falls on uneven surfaces such as grass and gravel   Pt will demonstrate improved ability to walk in the community with 6 min walk distance increase to >800 and with improved gait pattern  Pt will be independent and compliant with comprehensive HEP to maintain progress achieved in PT       Frequency / Duration: Patient will be seen for 2 x/week or a total of 8 visits over a 90 day period. Treatment will include: Gait training, Manual  Therapy, Neuromuscular Re-education, Therapeutic Activities, Therapeutic Exercise, and Home Exercise Program instruction    Education or treatment limitation: None  Rehab Potential:good        Patient/Family/Caregiver was advised of these findings, precautions, and treatment options and has agreed to actively participate in planning and for this course of care.    Thank you for your referral. Please co-sign or sign and return this letter via fax as soon as possible to [phone]. If you have any questions, please contact me at Dept: [phone]    Sincerely,  Electronically signed by therapist: Sara Berumen PT  Physician's certification required: Yes  I certify the need for these services furnished under this plan of treatment and while under my care.    X___________________________________________________ Date____________________    Certification From: 4/8/2024  To:7/7/2024

## 2024-04-11 ENCOUNTER — OFFICE VISIT (OUTPATIENT)
Dept: PHYSICAL THERAPY | Age: 74
End: 2024-04-11
Attending: INTERNAL MEDICINE
Payer: MEDICARE

## 2024-04-11 DIAGNOSIS — R53.1 WEAKNESS GENERALIZED: Primary | ICD-10-CM

## 2024-04-11 PROCEDURE — 97110 THERAPEUTIC EXERCISES: CPT

## 2024-04-11 NOTE — PROGRESS NOTES
.   Diagnosis:   Weakness generalized (R53.1)          Referring Provider: Krishna Mcneal  Date of Evaluation:     4/8/2024    Precautions:  Fall Risk Next MD visit:   none scheduled  Date of Surgery: n/a   Insurance Primary/Secondary: [employer] [facility] / N/A     # Auth Visits: N/A            Subjective: The patient reports she was feeling good after the first visit but yesterday they were at the store with  and she felt she overdid her activity that she felt very sore.    Pain: 0/10 pain /soreness prior to tx and no significant pain throughout the session.      Objective: Treatment per log      Assessment: The patient was starting to feel muscle soreness but was able to tolerate the rest of the exercises with rest breaks as needed.She was given red band for hip abd/clams exercises at home.      Goals:   Goals: (to be met in 8 visits)  Pt will improve her ability to get up from chair with 5 sit to stand to <20 sec to be able to perform  car transfers or toilet transfer without difficulty   Pt will improve hip ABD and ER strength to >4/5 to increase ease with standing and walking   Pt will be able to squat to  light objects around the house and or getting up from floor without difficulty.  Pt will improve functional hip strength to report ability to ascend/descend 1 flight of stairs reciprocally without or at least 1 hand support on handrail   Pt will demonstrate improved SLS to >10 seconds DELANEY to promote safety and decrease risk of falls on uneven surfaces such as grass and gravel   Pt will demonstrate improved ability to walk in the community with 6 min walk distance increase to >800 and with improved gait pattern  Pt will be independent and compliant with comprehensive HEP to maintain progress achieved in PT       Plan: Continue with progressive strengthening exercises.  Date: 4/11/2024  TX#: 2/8 Date:                 TX#: 3/ Date:                 TX#: 4/ Date:                 TX#: 5/ Date:   Tx#: 6/    Thera Ex: 45'       NustepL6 x 8'  Gastroc stretch 1'x2  Shuttle#5 DLP  with red band  hip abd 3x10  Heel raisers 3x10  SLP with red band diagonal pull 2x15 R/L  Fwd lunges LE only x10 then added BUE OH x 10  H/L red band clams 3x10  Bridges x20   Bridge with clams 3x10  Standing TKE  with green band 10\" 2x10 R/L                     HEP: repeated sit to stand, red band clams, bridges , bridge with clams    Charges: Thera Ex x 3       Total Timed Treatment: 45 min  Total Treatment Time: 45 min      LOWER EXTREMITY EVALUATION:     Diagnosis:   Weakness generalized (R53.1)       Referring Provider: Krishna Mcneal  Date of Evaluation:    4/8/2024    Precautions:  Fall Risk Next MD visit:   none scheduled  Date of Surgery: n/a     PATIENT SUMMARY   Carmen Sears is a 74 year old female who presents to therapy today with complaints of Leg weakness  for several weeks but has gotten worse and was admitted to the hospital due to hyponatremia.The patient mentioned she has lost weight  for 70 lbs for the last year intentionally doing intermittent fasting.She has been under the care of her physician monitoring her labs and she wants to work on strengthening in therapy as she is limited with her ability to walk outdoors for distances.      Pt denies any pain but has thigh discomfort and soreness when walking for distances     Current functional limitations include decreased tolerance to distance ambulation.     Carmen describes prior level of function . Pt goals include to be stronger and try to be active again  .  Past medical history was reviewed with Carmen. Significant findings include :  Hypertensive heart disease with congestive heart failure (HCC),Postablative hypothyroidism,Anxiety  Obstructive sleep apnea syndrome, Type 2 diabetes mellitus with microalbuminuria, without long-term current use of insulin (HCC)  Mixed hyperlipidemia,Iron deficiency anemia,Vitamin B12 deficiency,History of anal cancer,Folic acid  deficiency  Irritable bowel syndrome with diarrhea,Diabetic peripheral neuropathy (HCC)  Non-healing non-surgical wound, Fracture of left shoulder girdle, part unspecified, subsequent encounter for fracture with delayed healing  Acute pain of left shoulder,Decreased range of motion of left shoulde,Radiation skin ulcer (HCC)  CHF (NYHA class I, ACC/AHA stage B) (HCC).Atherosclerosis of aorta (HCC)     ASSESSMENT  Carmen presents to physical therapy evaluation with primary c/o lower extremity weakness and difficulty walking for increased distances . The results of the objective tests and measures show LE weakness and decreased flexibility of hip and knee muscles, gait deviation and difficulty with longer distances.  Functional deficits include but are not limited . Pt and PT discussed evaluation findings, pathology, POC and HEP.  Pt voiced understanding and performs HEP correctly without reported pain. Skilled Physical Therapy is medically necessary to address the above impairments and reach functional goals.     OBJECTIVE:   Observation: difficulty with sit to stand , ambulates with narrow GAVIN almost tandem walking  Palpation: (-) TPP on BLE  Sensation: WNL on BLE    AROM: (* denotes performed with pain)  WFL on BLE  Flexibility:  Hip Flexor: R Mod tightness, L Mod tightness  Hamstrings: R mod; L min tightness  Piriformis: R min mod; L mod  Quads: R Mod tightness ; L Mod tightness  Gastroc-soleus: R mod tightness ; L mod tightness    Strength/MMT: (* denotes performed with pain)  Hip Knee Foot/Ankle   Flexion: R 4/5; L 4/5  Extension: R 3-/5; L 3-/5  Abduction: R 3+/5; L 3+/5  ER: R 3+/5; L 3+/5  IR: R 3+/5; L #+/5 Flexion: R 4/5; L 4/5  Extension: R 4+/5; L 4+/5    DF: R 5/5; L 5/5  PF: R 5/5; L 4/5  INV: R 5/5; L 4/5  EV: R 5/5; L 4/5         Gait: pt ambulates on level ground without A.D. normal step length with slower conner but narrow base of support almost crossing or tandem walk occasionally.  6 min walk  test 616 ft with episode of almost losing balance, she is also feeling getting fatigued and leg discomfort around 3 mins of walking but tried to complete the 6 mins test    Balance: SLS: R 8 sec, L 9 sec  5sit to stand 30 sec   SLS R 8 sec , L 9 sec   Squatting : difficulty to go downe wiht bilateral  hip internally rotated and having a harder time to get up .    LEFS Score  LEFS Score: 37.5 % (4/2/2024  9:09 AM)      Bandar’s Treatment and Response:   Pt education was provided on exam findings, treatment diagnosis, treatment plan, expectations, and prognosis. Pt was also provided recommendations for activity modifications, possible soreness after evaluation, postural corrections, pain science education , detrimental fear avoidance behaviors, importance of remaining active, and strategies to reduce fall risk at home.  Patient was instructed in and issued a HEP for: repeated sit to stand, hip stretches such as mod romain stretch and quad stretch    Charges: PT Eval High Complexity, Thera Act      Total Timed Treatment: 8 min     Total Treatment Time: 45 min     Based on clinical rationale and outcome measures, this evaluation involved High Complexity decision making due to 3+ personal factors/comorbidities, 4+ body structures involved/activity limitations, and evolving symptoms including  improved ability to walk outdoors .  PLAN OF CARE:    Goals: (to be met in 8 visits)  Pt will improve her ability to get up from chair with 5 sit to stand to <20 sec to be able to perform  car transfers or toilet transfer without difficulty   Pt will improve hip ABD and ER strength to >4/5 to increase ease with standing and walking   Pt will be able to squat to  light objects around the house and or getting up from floor without difficulty.  Pt will improve functional hip strength to report ability to ascend/descend 1 flight of stairs reciprocally without or at least 1 hand support on handrail   Pt will demonstrate improved  SLS to >10 seconds DELANEY to promote safety and decrease risk of falls on uneven surfaces such as grass and gravel   Pt will demonstrate improved ability to walk in the community with 6 min walk distance increase to >800 and with improved gait pattern  Pt will be independent and compliant with comprehensive HEP to maintain progress achieved in PT       Frequency / Duration: Patient will be seen for 2 x/week or a total of 8 visits over a 90 day period. Treatment will include: Gait training, Manual Therapy, Neuromuscular Re-education, Therapeutic Activities, Therapeutic Exercise, and Home Exercise Program instruction    Education or treatment limitation: None  Rehab Potential:good        Patient/Family/Caregiver was advised of these findings, precautions, and treatment options and has agreed to actively participate in planning and for this course of care.    Thank you for your referral. Please co-sign or sign and return this letter via fax as soon as possible to [phone]. If you have any questions, please contact me at Dept: [phone]    Sincerely,  Electronically signed by therapist: Sara Berumen, PT  Physician's certification required: Yes  I certify the need for these services furnished under this plan of treatment and while under my care.    X___________________________________________________ Date____________________    Certification From: 4/8/2024  To:7/7/2024

## 2024-04-16 ENCOUNTER — APPOINTMENT (OUTPATIENT)
Dept: PHYSICAL THERAPY | Age: 74
End: 2024-04-16
Attending: INTERNAL MEDICINE
Payer: MEDICARE

## 2024-04-18 ENCOUNTER — OFFICE VISIT (OUTPATIENT)
Dept: PHYSICAL THERAPY | Age: 74
End: 2024-04-18
Attending: INTERNAL MEDICINE
Payer: MEDICARE

## 2024-04-18 DIAGNOSIS — R53.1 WEAKNESS GENERALIZED: Primary | ICD-10-CM

## 2024-04-18 PROCEDURE — 97110 THERAPEUTIC EXERCISES: CPT

## 2024-04-18 NOTE — PROGRESS NOTES
.   Diagnosis:   Weakness generalized (R53.1)          Referring Provider: Krishna Mcneal  Date of Evaluation:     4/8/2024    Precautions:  Fall Risk Next MD visit:   none scheduled  Date of Surgery: n/a   Insurance Primary/Secondary: JOSE [facility] / N/A     # Auth Visits: N/A            Subjective: The patient is so glad to mention that her latest blood test showed good numbers and does feel better overall.She has tired going to stores for extended walking she still woutl feeld sore \"after awhile.    Pain: 2/10 pain /soreness on gluteals     Objective:  5 sit to stand=: 31 sec   SLS R 5 sec , L 6 sec     Assessment: Pt as feeling more muscle soreness with exercises but tried tolerating it with rest breaks.      Goals:   Goals: (to be met in 8 visits)  Pt will improve her ability to get up from chair with 5 sit to stand to <20 sec to be able to perform  car transfers or toilet transfer without difficulty   Pt will improve hip ABD and ER strength to >4/5 to increase ease with standing and walking   Pt will be able to squat to  light objects around the house and or getting up from floor without difficulty.  Pt will improve functional hip strength to report ability to ascend/descend 1 flight of stairs reciprocally without or at least 1 hand support on handrail   Pt will demonstrate improved SLS to >10 seconds DELANEY to promote safety and decrease risk of falls on uneven surfaces such as grass and gravel   Pt will demonstrate improved ability to walk in the community with 6 min walk distance increase to >800 and with improved gait pattern  Pt will be independent and compliant with comprehensive HEP to maintain progress achieved in PT       Plan: Continue with progressive strengthening exercises.  Date: 4/11/2024  TX#: 2/8 Date:4/18/2024                TX#: 3/8 Date:                 TX#: 4/ Date:                 TX#: 5/ Date:   Tx#: 6/   Thera Ex: 45' Threa Ex:43      NustepL6 x 8'  Gastroc stretch 1'x2  Shuttle#5  DLP  with red band  hip abd 3x10  Heel raisers 3x10  SLP with red band diagonal pull 2x15 R/L  Fwd lunges LE only x10 then added BUE OH x 10  H/L red band clams 3x10  Bridges x20   Bridge with clams 3x10  Standing TKE  with green band 10\" 2x10 R/L NustepL6 x 8'  Gastroc stretch 1'x2  4\" steps up x 20 R/L   Fwd then sides  Shuttle#6 DLP  with red band  hip abd 3x10  Heel raisers 3x10  SLP with red band diagonal pull 2x15 R/L  Fwd lunges LE only x10 then added BUE OH x 10  H/L red band clams 3x10  Bridges x20   Bridge with clams 3x10                      HEP: repeated sit to stand, red band clams, bridges , bridge with clams    Charges: Thera Ex x 3       Total Timed Treatment: 43 min  Total Treatment Time: 43 min      LOWER EXTREMITY EVALUATION:     Diagnosis:   Weakness generalized (R53.1)       Referring Provider: Krishna Mcneal  Date of Evaluation:    4/8/2024    Precautions:  Fall Risk Next MD visit:   none scheduled  Date of Surgery: n/a     PATIENT SUMMARY   Carmen Sears is a 74 year old female who presents to therapy today with complaints of Leg weakness  for several weeks but has gotten worse and was admitted to the hospital due to hyponatremia.The patient mentioned she has lost weight  for 70 lbs for the last year intentionally doing intermittent fasting.She has been under the care of her physician monitoring her labs and she wants to work on strengthening in therapy as she is limited with her ability to walk outdoors for distances.      Pt denies any pain but has thigh discomfort and soreness when walking for distances     Current functional limitations include decreased tolerance to distance ambulation.     Carmen describes prior level of function . Pt goals include to be stronger and try to be active again  .  Past medical history was reviewed with Carmen. Significant findings include :  Hypertensive heart disease with congestive heart failure (HCC),Postablative  hypothyroidism,Anxiety  Obstructive sleep apnea syndrome, Type 2 diabetes mellitus with microalbuminuria, without long-term current use of insulin (HCC)  Mixed hyperlipidemia,Iron deficiency anemia,Vitamin B12 deficiency,History of anal cancer,Folic acid deficiency  Irritable bowel syndrome with diarrhea,Diabetic peripheral neuropathy (HCC)  Non-healing non-surgical wound, Fracture of left shoulder girdle, part unspecified, subsequent encounter for fracture with delayed healing  Acute pain of left shoulder,Decreased range of motion of left shoulde,Radiation skin ulcer (HCC)  CHF (NYHA class I, ACC/AHA stage B) (HCC).Atherosclerosis of aorta (HCC)     ASSESSMENT  Carmen presents to physical therapy evaluation with primary c/o lower extremity weakness and difficulty walking for increased distances . The results of the objective tests and measures show LE weakness and decreased flexibility of hip and knee muscles, gait deviation and difficulty with longer distances.  Functional deficits include but are not limited . Pt and PT discussed evaluation findings, pathology, POC and HEP.  Pt voiced understanding and performs HEP correctly without reported pain. Skilled Physical Therapy is medically necessary to address the above impairments and reach functional goals.     OBJECTIVE:   Observation: difficulty with sit to stand , ambulates with narrow GAVIN almost tandem walking  Palpation: (-) TPP on BLE  Sensation: WNL on BLE    AROM: (* denotes performed with pain)  WFL on BLE  Flexibility:  Hip Flexor: R Mod tightness, L Mod tightness  Hamstrings: R mod; L min tightness  Piriformis: R min mod; L mod  Quads: R Mod tightness ; L Mod tightness  Gastroc-soleus: R mod tightness ; L mod tightness    Strength/MMT: (* denotes performed with pain)  Hip Knee Foot/Ankle   Flexion: R 4/5; L 4/5  Extension: R 3-/5; L 3-/5  Abduction: R 3+/5; L 3+/5  ER: R 3+/5; L 3+/5  IR: R 3+/5; L #+/5 Flexion: R 4/5; L 4/5  Extension: R 4+/5; L 4+/5     DF: R 5/5; L 5/5  PF: R 5/5; L 4/5  INV: R 5/5; L 4/5  EV: R 5/5; L 4/5         Gait: pt ambulates on level ground without A.D. normal step length with slower conner but narrow base of support almost crossing or tandem walk occasionally.  6 min walk test 616 ft with episode of almost losing balance, she is also feeling getting fatigued and leg discomfort around 3 mins of walking but tried to complete the 6 mins test    Balance: SLS: R 8 sec, L 9 sec  5sit to stand 30 sec   SLS R 8 sec , L 9 sec   Squatting : difficulty to go downe wiht bilateral  hip internally rotated and having a harder time to get up .    LEFS Score  LEFS Score: 37.5 % (4/2/2024  9:09 AM)      Bandar’s Treatment and Response:   Pt education was provided on exam findings, treatment diagnosis, treatment plan, expectations, and prognosis. Pt was also provided recommendations for activity modifications, possible soreness after evaluation, postural corrections, pain science education , detrimental fear avoidance behaviors, importance of remaining active, and strategies to reduce fall risk at home.  Patient was instructed in and issued a HEP for: repeated sit to stand, hip stretches such as mod romain stretch and quad stretch    Charges: PT Eval High Complexity, Thera Act      Total Timed Treatment: 8 min     Total Treatment Time: 45 min     Based on clinical rationale and outcome measures, this evaluation involved High Complexity decision making due to 3+ personal factors/comorbidities, 4+ body structures involved/activity limitations, and evolving symptoms including  improved ability to walk outdoors .  PLAN OF CARE:    Goals: (to be met in 8 visits)  Pt will improve her ability to get up from chair with 5 sit to stand to <20 sec to be able to perform  car transfers or toilet transfer without difficulty   Pt will improve hip ABD and ER strength to >4/5 to increase ease with standing and walking   Pt will be able to squat to  light objects  around the house and or getting up from floor without difficulty.  Pt will improve functional hip strength to report ability to ascend/descend 1 flight of stairs reciprocally without or at least 1 hand support on handrail   Pt will demonstrate improved SLS to >10 seconds DELANEY to promote safety and decrease risk of falls on uneven surfaces such as grass and gravel   Pt will demonstrate improved ability to walk in the community with 6 min walk distance increase to >800 and with improved gait pattern  Pt will be independent and compliant with comprehensive HEP to maintain progress achieved in PT       Frequency / Duration: Patient will be seen for 2 x/week or a total of 8 visits over a 90 day period. Treatment will include: Gait training, Manual Therapy, Neuromuscular Re-education, Therapeutic Activities, Therapeutic Exercise, and Home Exercise Program instruction    Education or treatment limitation: None  Rehab Potential:good        Patient/Family/Caregiver was advised of these findings, precautions, and treatment options and has agreed to actively participate in planning and for this course of care.    Thank you for your referral. Please co-sign or sign and return this letter via fax as soon as possible to [phone]. If you have any questions, please contact me at Dept: [phone]    Sincerely,  Electronically signed by therapist: Sara Berumen PT  Physician's certification required: Yes  I certify the need for these services furnished under this plan of treatment and while under my care.    X___________________________________________________ Date____________________    Certification From: 4/8/2024  To:7/7/2024

## 2024-04-23 ENCOUNTER — OFFICE VISIT (OUTPATIENT)
Dept: PHYSICAL THERAPY | Age: 74
End: 2024-04-23
Attending: INTERNAL MEDICINE
Payer: MEDICARE

## 2024-04-23 DIAGNOSIS — R53.1 WEAKNESS GENERALIZED: Primary | ICD-10-CM

## 2024-04-23 PROCEDURE — 97110 THERAPEUTIC EXERCISES: CPT

## 2024-04-23 NOTE — PROGRESS NOTES
.   Diagnosis:   Weakness generalized (R53.1)          Referring Provider: Krishna Mcneal  Date of Evaluation:     4/8/2024    Precautions:  Fall Risk Next MD visit:   none scheduled  Date of Surgery: n/a   Insurance Primary/Secondary: [employer] [facility] / N/A     # Auth Visits: N/A           Discharge Summary  Pt has attended 4 visits in Physical Therapy.     Subjective: Pt felt a lot better in general , yesterday she was able to do yard work and able to squat down lower to ground without significant difficulty.    Pain: 0/10 pain /soreness on gluteals  1-2 on lower back after 6 min walk test    Objective:    Strength/MMT: (* denotes performed with pain)  Hip Knee Foot/Ankle   Flexion: R 4/5; L 4/5  Extension: R 3-/5; L 3-/5  Abduction: R 3+/5; L 3+/5  ER: R 3+/5; L 3+/5  IR: R 3+/5; L 3+/5 Flexion: R 4/5; L 4/5  Extension: R 4+/5; L 4+/5    DF: R 5/5; L 5/5  PF: R 5/5; L 455  INV: R 5/5; L 5/5  EV: R 5/5; L 5/5       @Eval  Gait: pt ambulates on level ground without A.D. normal step length with slower conner but narrow base of support almost crossing or tandem walk occasionally.  6 min walk test 616 ft with episode of almost losing balance,     @ Discharge  6 min walk text 820 ft without leg pain, some lower back pain, improved gait pattern but still with narrower GAVIN    @ Eval                                                                @Discharge  5sit to stand 30 sec                                             5ST   26 sec   SLS R 8 sec , L 9 sec                                            Not better  Squatting: difficulty to get up                                 Improved    LEFS Score  LEFS Score: 37.5 % (4/2/2024  9:09 AM)    LEFS Score  LEFS Score: 37.5 % (4/2/2024  9:09 AM)      Post LEFS Score  Post LEFS Score: 56.25 % (4/23/2024 11:23 AM)    18.75 % improvement        Assessment: The patient expresses her interest to just continue doing her home exercises as she is feeling a lot better.She also considering  enrolling in the gym for silver sneakers program to continue doing her strengtheninng.      Goals:   Goals: (to be met in 8 visits)  Pt will improve her ability to get up from chair with 5 sit to stand to <20 sec to be able to perform  car transfers or toilet transfer without difficulty ,Improved , not met  Pt will improve hip ABD and ER strength to >4/5 to increase ease with standing and walking ,progressing,   Pt will be able to squat to  light objects around the house and or getting up from floor without difficulty.Improvd  Pt will improve functional hip strength to report ability to ascend/descend 1 flight of stairs reciprocally without or at least 1 hand support on handrail ,Improved  Pt will demonstrate improved SLS to >10 seconds DELANEY to promote safety and decrease risk of falls on uneven surfaces such as grass and gravel ,Not mbet  Pt will demonstrate improved ability to walk in the community with 6 min walk distance increase to >800 and with improved gait pattern,Met  Pt will be independent and compliant with comprehensive HEP to maintain progress achieved in PT ,Met    Date: 4/11/2024  TX#: 2/8 Date:4/18/2024                TX#: 3/8 Date:4/23/2024                TX#: 4/8 Date:                 TX#: 5/ Date:   Tx#: 6/   Thera Ex: 45' Threa Ex:43 Threa Ex:43'     NustepL6 x 8'  Gastroc stretch 1'x2  Shuttle#5 DLP  with red band  hip abd 3x10  Heel raisers 3x10  SLP with red band diagonal pull 2x15 R/L  Fwd lunges LE only x10 then added BUE OH x 10  H/L red band clams 3x10  Bridges x20   Bridge with clams 3x10  Standing TKE  with green band 10\" 2x10 R/L NustepL6 x 8'  Gastroc stretch 1'x2  4\" steps up x 20 R/L   Fwd then sides  Shuttle#6 DLP  with red band  hip abd 3x10  Heel raisers 3x10  SLP with red band diagonal pull 2x15 R/L  Fwd lunges LE only x10 then added BUE OH x 10  H/L red band clams 3x10  Bridges x20   Bridge with clams 3x10   NustepL 7 x 8'  Gastroc stretch 1'x2  4\" steps up x 20 R/L   Fwd  then sides  Shuttle#6 DLP  with red band  hip abd 3x10  Red band side steps  x1'   Monster walk fwd/bwd 1'  Resisted TKE 2x15  Test and measurements for discharge  6mwt /5STS/SLS  Instructions and copy of HEP.                   HEP: repeated sit to stand, red band clams, bridges , bridge with clams, resisted side steps,monster walks, and resisted TKE.    Charges: Thera Ex x 3       Total Timed Treatment: 43 min  Total Treatment Time: 43 min      LOWER EXTREMITY EVALUATION:     Diagnosis:   Weakness generalized (R53.1)       Referring Provider: Krishna Mcneal  Date of Evaluation:    4/8/2024    Precautions:  Fall Risk Next MD visit:   none scheduled  Date of Surgery: n/a     PATIENT SUMMARY   Carmen Sears is a 74 year old female who presents to therapy today with complaints of Leg weakness  for several weeks but has gotten worse and was admitted to the hospital due to hyponatremia.The patient mentioned she has lost weight  for 70 lbs for the last year intentionally doing intermittent fasting.She has been under the care of her physician monitoring her labs and she wants to work on strengthening in therapy as she is limited with her ability to walk outdoors for distances.      Pt denies any pain but has thigh discomfort and soreness when walking for distances     Current functional limitations include decreased tolerance to distance ambulation.     Carmen describes prior level of function . Pt goals include to be stronger and try to be active again  .  Past medical history was reviewed with Carmen. Significant findings include :  Hypertensive heart disease with congestive heart failure (HCC),Postablative hypothyroidism,Anxiety  Obstructive sleep apnea syndrome, Type 2 diabetes mellitus with microalbuminuria, without long-term current use of insulin (HCC)  Mixed hyperlipidemia,Iron deficiency anemia,Vitamin B12 deficiency,History of anal cancer,Folic acid deficiency  Irritable bowel syndrome with  diarrhea,Diabetic peripheral neuropathy (HCC)  Non-healing non-surgical wound, Fracture of left shoulder girdle, part unspecified, subsequent encounter for fracture with delayed healing  Acute pain of left shoulder,Decreased range of motion of left shoulde,Radiation skin ulcer (HCC)  CHF (NYHA class I, ACC/AHA stage B) (HCC).Atherosclerosis of aorta (HCC)     ASSESSMENT  Carmen presents to physical therapy evaluation with primary c/o lower extremity weakness and difficulty walking for increased distances . The results of the objective tests and measures show LE weakness and decreased flexibility of hip and knee muscles, gait deviation and difficulty with longer distances.  Functional deficits include but are not limited . Pt and PT discussed evaluation findings, pathology, POC and HEP.  Pt voiced understanding and performs HEP correctly without reported pain. Skilled Physical Therapy is medically necessary to address the above impairments and reach functional goals.     OBJECTIVE:   Observation: difficulty with sit to stand , ambulates with narrow GAVIN almost tandem walking  Palpation: (-) TPP on BLE  Sensation: WNL on BLE    AROM: (* denotes performed with pain)  WFL on BLE  Flexibility:  Hip Flexor: R Mod tightness, L Mod tightness  Hamstrings: R mod; L min tightness  Piriformis: R min mod; L mod  Quads: R Mod tightness ; L Mod tightness  Gastroc-soleus: R mod tightness ; L mod tightness    Strength/MMT: (* denotes performed with pain)  Hip Knee Foot/Ankle   Flexion: R 4/5; L 4/5  Extension: R 3-/5; L 3-/5  Abduction: R 3+/5; L 3+/5  ER: R 3+/5; L 3+/5  IR: R 3+/5; L #+/5 Flexion: R 4/5; L 4/5  Extension: R 4+/5; L 4+/5    DF: R 5/5; L 5/5  PF: R 5/5; L 4/5  INV: R 5/5; L 4/5  EV: R 5/5; L 4/5         Gait: pt ambulates on level ground without A.D. normal step length with slower conner but narrow base of support almost crossing or tandem walk occasionally.  6 min walk test 616 ft with episode of almost losing  balance, she is also feeling getting fatigued and leg discomfort around 3 mins of walking but tried to complete the 6 mins test    Balance: SLS: R 8 sec, L 9 sec  5sit to stand 30 sec   SLS R 8 sec , L 9 sec   Squatting : difficulty to go downe wiht bilateral  hip internally rotated and having a harder time to get up .    LEFS Score  LEFS Score: 37.5 % (4/2/2024  9:09 AM)      Bandar’s Treatment and Response:   Pt education was provided on exam findings, treatment diagnosis, treatment plan, expectations, and prognosis. Pt was also provided recommendations for activity modifications, possible soreness after evaluation, postural corrections, pain science education , detrimental fear avoidance behaviors, importance of remaining active, and strategies to reduce fall risk at home.  Patient was instructed in and issued a HEP for: repeated sit to stand, hip stretches such as mod romain stretch and quad stretch    Charges: PT Eval High Complexity, Thera Act      Total Timed Treatment: 8 min     Total Treatment Time: 45 min     Based on clinical rationale and outcome measures, this evaluation involved High Complexity decision making due to 3+ personal factors/comorbidities, 4+ body structures involved/activity limitations, and evolving symptoms including  improved ability to walk outdoors .  PLAN OF CARE:    Goals: (to be met in 8 visits)  Pt will improve her ability to get up from chair with 5 sit to stand to <20 sec to be able to perform  car transfers or toilet transfer without difficulty   Pt will improve hip ABD and ER strength to >4/5 to increase ease with standing and walking   Pt will be able to squat to  light objects around the house and or getting up from floor without difficulty.  Pt will improve functional hip strength to report ability to ascend/descend 1 flight of stairs reciprocally without or at least 1 hand support on handrail   Pt will demonstrate improved SLS to >10 seconds DELANEY to promote safety  and decrease risk of falls on uneven surfaces such as grass and gravel   Pt will demonstrate improved ability to walk in the community with 6 min walk distance increase to >800 and with improved gait pattern  Pt will be independent and compliant with comprehensive HEP to maintain progress achieved in PT       Frequency / Duration: Patient will be seen for 2 x/week or a total of 8 visits over a 90 day period. Treatment will include: Gait training, Manual Therapy, Neuromuscular Re-education, Therapeutic Activities, Therapeutic Exercise, and Home Exercise Program instruction    Education or treatment limitation: None  Rehab Potential:good        Patient/Family/Caregiver was advised of these findings, precautions, and treatment options and has agreed to actively participate in planning and for this course of care.    Thank you for your referral. Please co-sign or sign and return this letter via fax as soon as possible to [phone]. If you have any questions, please contact me at Dept: [phone]    Sincerely,  Electronically signed by therapist: Sara Berumen PT  Physician's certification required: Yes  I certify the need for these services furnished under this plan of treatment and while under my care.    X___________________________________________________ Date____________________    Certification From: 4/8/2024  To:7/7/2024

## 2024-04-25 ENCOUNTER — APPOINTMENT (OUTPATIENT)
Dept: PHYSICAL THERAPY | Age: 74
End: 2024-04-25
Attending: INTERNAL MEDICINE
Payer: MEDICARE

## 2024-04-30 ENCOUNTER — APPOINTMENT (OUTPATIENT)
Dept: PHYSICAL THERAPY | Age: 74
End: 2024-04-30
Attending: INTERNAL MEDICINE
Payer: MEDICARE

## 2024-05-02 ENCOUNTER — APPOINTMENT (OUTPATIENT)
Dept: PHYSICAL THERAPY | Age: 74
End: 2024-05-02
Attending: INTERNAL MEDICINE
Payer: MEDICARE

## 2024-05-07 ENCOUNTER — APPOINTMENT (OUTPATIENT)
Dept: PHYSICAL THERAPY | Age: 74
End: 2024-05-07
Attending: INTERNAL MEDICINE
Payer: MEDICARE

## 2024-05-10 ENCOUNTER — APPOINTMENT (OUTPATIENT)
Dept: PHYSICAL THERAPY | Age: 74
End: 2024-05-10
Attending: INTERNAL MEDICINE
Payer: MEDICARE